# Patient Record
Sex: MALE | Race: WHITE | NOT HISPANIC OR LATINO | Employment: FULL TIME | URBAN - METROPOLITAN AREA
[De-identification: names, ages, dates, MRNs, and addresses within clinical notes are randomized per-mention and may not be internally consistent; named-entity substitution may affect disease eponyms.]

---

## 2017-03-06 ENCOUNTER — ALLSCRIPTS OFFICE VISIT (OUTPATIENT)
Dept: OTHER | Facility: OTHER | Age: 57
End: 2017-03-06

## 2017-03-06 DIAGNOSIS — L40.9 PSORIASIS: ICD-10-CM

## 2017-03-06 DIAGNOSIS — Z13.89 ENCOUNTER FOR SCREENING FOR OTHER DISORDER: ICD-10-CM

## 2017-05-31 ENCOUNTER — ALLSCRIPTS OFFICE VISIT (OUTPATIENT)
Dept: OTHER | Facility: OTHER | Age: 57
End: 2017-05-31

## 2017-08-29 ENCOUNTER — ALLSCRIPTS OFFICE VISIT (OUTPATIENT)
Dept: OTHER | Facility: OTHER | Age: 57
End: 2017-08-29

## 2017-12-04 ENCOUNTER — ALLSCRIPTS OFFICE VISIT (OUTPATIENT)
Dept: OTHER | Facility: OTHER | Age: 57
End: 2017-12-04

## 2017-12-06 NOTE — PROGRESS NOTES
Assessment  1  Psoriasis (696 1) (L40 9)   2  Screening for skin condition (V82 0) (Z13 89)    Plan     · Clobetasol Propionate 0 05 % External Solution; APPLY AND GENTLY MASSAGEINTO AFFECTED AREA(S) TWICE DAILY   · Enbrel SureClick 50 MG/ML Subcutaneous Solution Auto-injector; 3 34YD sc qweek   · Salicylic Acid Powder; 30% sallcylic acid iin the 1 Aquaphor ointment applied topsoriasis plaques daily     · Follow-up visit in 3 months Evaluation and Treatment  Follow-up  Status: Complete Done: 70JBF9988    Discussion/Summary  Discussion Summary:   Psoriasis under good control continue same therapy  Will continue with topical treatment as previous see him back in 3 months will repeat blood work at that time  Nothing else of concern noted  Chief Complaint  Chief Complaint Free Text Note Form: 3 month follow up for psoriasis treatment      History of Present Illness  HPI: 52-year-old male presents for follow-up for psoriasis  Patient doing well better that he was previously noted a was almost clear when he got some sun exposure over the summer  Review of Systems  Complete Male Dermatology [facility] Patient:  Constitutional: Denies constitutional symptoms  Eyes: Denies eye symptoms  ENT:  denies ear symptoms, nasal symptoms, mouth or throat symptoms  Cardiovascular: Denies cardiovascular symptoms  Respiratory: Denies respiratory symptoms  Gastrointestinal: Denies gastrointestinal symptoms  Musculoskeletal: Denies musculoskeletal symptoms  Integumentary: Denies skin, hair and nail symptoms  Neurological: Denies neurologic symptoms  Psychiatric: Denies psychiatric symptoms  Endocrine: Denies endocrine symptoms  Hematologic/Lymphatic: Denies hematologic symptoms  Active Problems  1  Psoriasis (696 1) (L40 9)   2  Screening for skin condition (V82 0) (Z13 89)   3  Well adult on routine health check (V70 0) (Z00 00)    Past Medical History    1  History of Callus (700) (L84)   2   History of Corns (700) (L84)   3  History of psoriasis (V13 3) (Z87 2)   4  History of seborrheic keratosis (V13 3) (Z87 2)   5  History of viral warts (V12 09) (Z86 19)   6  History of viral warts (V12 09) (Z86 19)   7  History of Long term use of drug (V58 69) (Z79 899)   8  History of Long term use of drug (V58 69) (Z79 899)   9  History of Nonspecific reaction to tuberculin skin test without active tuberculosis (795 51) (R76 11)   10  History of Screening examination for pulmonary tuberculosis (V74 1) (Z11 1)   11  History of Screening for skin condition (V82 0) (Z13 89)   12  History of Screening for skin condition (V82 0) (Z13 89)  Past Medical History Reviewed- Derm:   The past medical history was reviewed  Surgical History  1  History of Vaccines Prophylactic Need Against Single Disease (V05 9)  Surgical History Reviewed [facility]- Derm:   Surgical History reviewed      Family History  Mother    1  No pertinent family history  Family History Reviewed- Derm:   Family History was reviewed      Social History     · Former smoker (V15 82) (T19 783)   · [address] (DME)  Social History Reviewed [facility]- Derm: The social history was reviewed      Current Meds   1  AmLODIPine Besylate 10 MG Oral Tablet; Therapy: 13Apr2014 to (Evaluate:13Hmc1513) Recorded   2  Clobetasol Propionate 0 05 % External Solution; APPLY AND GENTLY MASSAGE INTO AFFECTED AREA(S) TWICE DAILY  Requested for: 21Nov2016; Last Rx:21Nov2016 Ordered   3  Clobex Spray 0 05 % External Liquid; Therapy: (Recorded:21Nov2016) to Recorded   4  Dexilant 60 MG Oral Capsule Delayed Release; Therapy: 02VTQ0692 to Recorded   5  Doxepin HCl Powder; Therapy: 77IEW2172 to Recorded   6  Enbrel 50 MG/ML Subcutaneous Solution Prefilled Syringe; INJECT ONE SYRINGE (50 MG) SUBCUTANEOUSLY ONCE A WEEK  REFRIGERATE  DONOT FREEZE; Therapy: 49XCM4602 to (Evaluate:37Knv9903)  Requested for: 83MPO9714; Last Rx:27Nov2017 Ordered   7   Enbrel SureClick 50 MG/ML Subcutaneous Solution Auto-injector; 0 98ml sc q week; Therapy: 01YQS6351 to (Evaluate:78Wgt0908)  Requested for: 32CMU5916; Last Rx:80Ifu0876 Ordered   8  Fish Oil CAPS; Therapy: (Recorded:85Ons2155) to Recorded   9  [place]; Therapy: (Recorded:33Jyc2916) to Recorded   10  Multi-Vitamin TABS; Therapy: (Recorded:45Asa8393) to Recorded   11  Potassimin 75 MG Oral Tablet; Therapy: (Recorded:58Rvs3869) to Recorded   12  Salicylic Acid Powder; 36% sallcylic acid iin the 1 Aquaphor ointment applied to psoriasis  plaques daily; Therapy: 14RGD7424 to (Last Rx:21Nov2016) Ordered   13  Valsartan-Hydrochlorothiazide 320-12 5 MG Oral Tablet; Therapy: 13Apr2014 to (Evaluate:52Kmc0358) Recorded  Medication List Reviewed: The medication list was reviewed and updated today  Allergies    1  No Known Drug Allergies    Physical Exam   Constitutional  General appearance: Appears healthy and well developed  Lymphatic  No visible disturbance  Musculoskeletal  Digits and nails: No clubbing, cyanosis or edema  Cutaneous and nail exam normal    Skin  Scalp skin texture and hair distribution: Normal skin texture on scalp, normal hair distribution  Head: Normal turgor, no rashes, no lesions  Neck: Normal turgor, no rashes, no lesions  Chest: Normal turgor, no rashes, no lesions  Abdomen: Normal turgor, no rashes, no lesions  Right upper extremity: Abnormal    Left upper extremity: Abnormal    Right lower extremity: Abnormal    Neuro/Psych  Alert and oriented x 3  Displays comfort and cooperation during encounterl  Affect is normal    Finding Most of his torso appears clear scaling erythematous well-demarcated patches and plaques noted mostly on the elbows and knees marked improvement from previous still involving close to 5% of body surface area  Future Appointments    Date/Time Provider Specialty Site   03/06/2018 03:15 PM ALVINA Salter   Dermatology [facility] Signatures   Electronically signed by : ALVINA Nails ; Dec  4 2017  5:15PM EST                       (Author)

## 2018-01-12 NOTE — PROGRESS NOTES
Assessment    1  Psoriasis (696 1) (L40 9)   2  Screening for skin condition (V82 0) (Z13 89)    Plan    · Clobetasol Propionate 0 05 % External Solution; APPLY AND GENTLY MASSAGE  INTO AFFECTED AREA(S) TWICE DAILY   · Enbrel SureClick 50 MG/ML Subcutaneous Solution Auto-injector; 0 98ml sc q  week    · Follow-up visit in 3 months Evaluation and Treatment  Follow-up  Status: Complete   Done: 50FKT0733    Discussion/Summary  Discussion Summary:   Psoriasis under good control with Enbrel continue same therapy we'll plan followup in 3 months we also refilled his topical clobetasol solution  Chief Complaint  Chief Complaint Free Text Note Form: 3 MONTH FULL BODY SKIN CANCER EXAM      History of Present Illness  HPI: 80-year-old male presents for followup for psoriasis on Enbrel therapy overall doing well minimal activity still noted but not bothered by it  Recent blood work obtained with negative for TB and routine labs did not show any abnormality      Review of Systems  Complete Male Dermatology [facility] Patient:   Constitutional: Denies constitutional symptoms  Eyes: Denies eye symptoms  ENT:  denies ear symptoms, nasal symptoms, mouth or throat symptoms  Cardiovascular: Denies cardiovascular symptoms  Respiratory: Denies respiratory symptoms  Gastrointestinal: Denies gastrointestinal symptoms  Musculoskeletal: Denies musculoskeletal symptoms  Integumentary: Denies skin, hair and nail symptoms  Neurological: Denies neurologic symptoms  Psychiatric: Denies psychiatric symptoms  Endocrine: Denies endocrine symptoms  Hematologic/Lymphatic: Denies hematologic symptoms  Active Problems    1  Psoriasis (696 1) (L40 9)   2  Screening for skin condition (V82 0) (Z13 89)   3  Well adult on routine health check (V70 0) (Z00 00)    Past Medical History    1  History of Callus (700) (L84)   2  History of Corns (700) (L84)   3  History of psoriasis (V13 3) (Z87 2)   4   History of seborrheic keratosis (V13 3) (Z87 2)   5  History of viral warts (V12 09) (Z86 19)   6  History of viral warts (V12 09) (Z86 19)   7  History of Long term use of drug (V58 69) (Z79 899)   8  History of Long term use of drug (V58 69) (Z79 899)   9  History of Nonspecific reaction to tuberculin skin test without active tuberculosis (795 51)   (R76 11)   10  History of Screening examination for pulmonary tuberculosis (V74 1) (Z11 1)   11  History of Screening for skin condition (V82 0) (Z13 89)   12  History of Screening for skin condition (V82 0) (Z13 89)  Past Medical History Reviewed- Derm:   The past medical history was reviewed  Surgical History    1  History of Vaccines Prophylactic Need Against Single Disease (V05 9)  Surgical History Reviewed [facility]- Derm:   Surgical History reviewed      Family History    1  No pertinent family history  Family History Reviewed- Derm:   Family History was reviewed      Social History    · Former smoker (V15 82) (T00 479)   · [address] (DME)  Social History Reviewed [facility]- Derm: The social history was reviewed      Current Meds   1  AmLODIPine Besylate 10 MG Oral Tablet; Therapy: 13Apr2014 to (Evaluate:99Rmi1974) Recorded   2  Clobetasol Propionate 0 05 % External Solution; APPLY AND GENTLY MASSAGE INTO   AFFECTED AREA(S) TWICE DAILY  Requested for: 40WDV6917; Last Rx:30Jun2015   Ordered   3  Clobex Spray 0 05 % External Liquid; Therapy: (Recorded:18Yim0618) to Recorded   4  Dexilant 60 MG Oral Capsule Delayed Release; Therapy: 27RAB4194 to Recorded   5  Doxepin HCl Powder; Therapy: 63DPF5377 to Recorded   6  Enbrel 50 MG/ML Subcutaneous Solution Prefilled Syringe; INJECT 50MG                 SUBCUTANEOUSLY ONCE A     WEEK; Therapy: 12FZJ8537 to (Evaluate:81Pwd9901)  Requested for: 67Snj1560; Last   Rx:01Oct2015; Status: ACTIVE - Renewal Denied Ordered   7  Enbrel SureClick 50 MG/ML Subcutaneous Solution Auto-injector;    Therapy: 63YNH1936 to Recorded   8  Fish Oil CAPS; Therapy: (Recorded:95Fdm3590) to Recorded   9  Cornel Jus;   Therapy: (Recorded:82Ffp1017) to Recorded   10  Multi-Vitamin TABS; Therapy: (Recorded:91Avx3584) to Recorded   11  Potassimin 75 MG Oral Tablet; Therapy: (Recorded:24Mmi6687) to Recorded   12  Valsartan-Hydrochlorothiazide 320-12 5 MG Oral Tablet; Therapy: 13Apr2014 to (Evaluate:06Xvg5526) Recorded    Allergies    1  No Known Drug Allergies    Physical Exam    Constitutional   General appearance: Appears healthy and well developed  Lymphatic   No visible disturbance  Musculoskeletal   Digits and nails: No clubbing, cyanosis or edema  Cutaneous and nail exam normal     Skin   Scalp skin texture and hair distribution: Abnormal     Head: Normal turgor, no rashes, no lesions  Neck: Normal turgor, no rashes, no lesions  Chest: Normal turgor, no rashes, no lesions  Abdomen: Normal turgor, no rashes, no lesions  Back: Normal turgor, no rashes, no lesions  Right upper extremity: Abnormal     Left upper extremity: Abnormal     Right lower extremity: Abnormal     Left lower extremity: Abnormal     Neuro/Psych   Alert and oriented x 3  Displays comfort and cooperation during encounterl  Affect is normal     Finding Scaling erythematous well-demarcated patches noted on the elbows knees scalp and scattered areas on his back minimal involvement still close to 5% body surface nothing else of concern noted on complete exam       Future Appointments    Date/Time Provider Specialty Site   04/26/2016 03:35 PM ALVINA Hairston   Dermatology [facility] [place]     Signatures   Electronically signed by : ALVINA Garcia ; Jan 25 2016  4:18PM EST                       (Author)

## 2018-04-10 RX ORDER — ETANERCEPT 50 MG/ML
SOLUTION SUBCUTANEOUS
Refills: 2 | OUTPATIENT
Start: 2018-04-10

## 2018-04-16 RX ORDER — ETANERCEPT 50 MG/ML
SOLUTION SUBCUTANEOUS
Refills: 2 | OUTPATIENT
Start: 2018-04-16

## 2018-04-25 ENCOUNTER — OFFICE VISIT (OUTPATIENT)
Dept: DERMATOLOGY | Facility: CLINIC | Age: 58
End: 2018-04-25
Payer: COMMERCIAL

## 2018-04-25 ENCOUNTER — TRANSCRIBE ORDERS (OUTPATIENT)
Dept: LAB | Facility: CLINIC | Age: 58
End: 2018-04-25

## 2018-04-25 DIAGNOSIS — L40.9 PSORIASIS: Primary | ICD-10-CM

## 2018-04-25 PROCEDURE — 99213 OFFICE O/P EST LOW 20 MIN: CPT | Performed by: DERMATOLOGY

## 2018-04-25 RX ORDER — ETANERCEPT 50 MG/ML
SOLUTION SUBCUTANEOUS
Refills: 3 | COMMUNITY
Start: 2018-03-21 | End: 2019-01-22 | Stop reason: ALTCHOICE

## 2018-04-25 RX ORDER — CLOBETASOL PROPIONATE 0.46 MG/ML
SOLUTION TOPICAL 2 TIMES DAILY
COMMUNITY

## 2018-04-25 RX ORDER — TADALAFIL 5 MG/1
5 TABLET ORAL
COMMUNITY
Start: 2017-05-11

## 2018-04-25 RX ORDER — DOXEPIN HYDROCHLORIDE 10 MG/ML
SOLUTION ORAL
COMMUNITY
Start: 2015-02-04

## 2018-04-25 RX ORDER — MULTIVITAMIN
TABLET ORAL
COMMUNITY

## 2018-04-25 RX ORDER — VALSARTAN AND HYDROCHLOROTHIAZIDE 320; 12.5 MG/1; MG/1
TABLET, FILM COATED ORAL
COMMUNITY
Start: 2014-04-13

## 2018-04-25 RX ORDER — AMLODIPINE BESYLATE 10 MG/1
10 TABLET ORAL
COMMUNITY
Start: 2017-04-27

## 2018-04-25 RX ORDER — DEXLANSOPRAZOLE 60 MG/1
CAPSULE, DELAYED RELEASE ORAL
COMMUNITY
Start: 2015-06-08

## 2018-04-25 RX ORDER — CLOBETASOL PROPIONATE 0.05 G/ML
SPRAY TOPICAL
COMMUNITY

## 2018-04-25 NOTE — PATIENT INSTRUCTIONS
psoriasis still flaring despite being on therapy for over 10 years will go ahead and switch to Tremfya to see if we get this under better control    Will repeat blood work at this time

## 2018-04-25 NOTE — PROGRESS NOTES
[address] [facility] [address] [facility] DERMATOLOGY  [address] [address]     MRN: [id number] : 1960  Encounter: [id number]  Patient Information: Rosalea Reveal  Chief complaint: psoriasis follow-up    History of present illness:  60-year-old male presents for follow-up for psoriasis patient has been on Enbrel for almost 10 years overall tolerating it well however still seems to be having more activity especially on his arms and legs  No past medical history on file  No past surgical history on file  Social History   History   Alcohol use Not on file     History   Drug use: Unknown     History   Smoking Status    Former Smoker   Smokeless Tobacco    Never Used     No family history on file  Meds/Allergies   No Known Allergies    Meds:  Prior to Admission medications    Medication Sig Start Date End Date Taking?  Authorizing Provider   amLODIPine (NORVASC) 10 mg tablet Take 10 mg by mouth 17  Yes Historical Provider, MD   clobetasol (TEMOVATE) 0 05 % external solution Apply topically 2 (two) times a day   Yes Historical Provider, MD   clobetasol propionate (CLOBEX SPRAY) 0 05 % external spray Apply topically   Yes Historical Provider, MD   dexlansoprazole (DEXILANT) 60 MG capsule Take by mouth 6/8/15  Yes Historical Provider, MD   doxepin (SINEquan) 10 mg/mL solution by Does not apply route 2/4/15  Yes Historical Provider, MD   ENBREL 50 MG/ML SOSY  3/21/18  Yes Historical Provider, MD   esomeprazole (HM ESOMEPRAZOLE MAGNESIUM DR) 20 mg capsule Take 40 mg by mouth 17  Yes Historical Provider, MD   Multiple Vitamin (MULTI-VITAMIN DAILY) TABS Take by mouth   Yes Historical Provider, MD   tadalafil (CIALIS) 5 MG tablet Take 5 mg by mouth 17  Yes Historical Provider, MD   valsartan-hydrochlorothiazide (DIOVAN-HCT) 320-12 5 MG per tablet Take by mouth 14  Yes Historical Provider, MD       Subjective:     Review of Systems:    General: negative for - chills, fatigue, fever,  weight gain or weight loss  Psychological: negative for - anxiety, behavioral disorder, concentration difficulties, decreased libido, depression, irritability, memory difficulties, mood swings, sleep disturbances or suicidal ideation  ENT: negative for - hearing difficulties , nasal congestion, nasal discharge, oral lesions, sinus pain, sneezing, sore throat  Allergy and Immunology: negative for - hives, insect bite sensitivity,  Hematological and Lymphatic: negative for - bleeding problems, blood clots,bruising, swollen lymph nodes  Endocrine: negative for - hair pattern changes, hot flashes, malaise/lethargy, mood swings, palpitations, polydipsia/polyuria, skin changes, temperature intolerance or unexpected weight change  Respiratory: negative for - cough, hemoptysis, orthopnea, shortness of breath, or wheezing  Cardiovascular: negative for - chest pain, dyspnea on exertion, edema,  Gastrointestinal: negative for - abdominal pain, nausea/vomiting  Genito-Urinary: negative for - dysuria, incontinence, irregular/heavy menses or urinary frequency/urgency  Musculoskeletal: negative for - gait disturbance, joint pain, joint stiffness, joint swelling, muscle pain, muscular weakness  Dermatological:  As in HPI  Neurological: negative for confusion, dizziness, headaches, impaired coordination/balance, memory loss, numbness/tingling, seizures, speech problems, tremors or weakness       Objective: There were no vitals taken for this visit      Physical Exam:    General Appearance:    Alert, cooperative, no distress   Head:    Normocephalic, without obvious abnormality, atraumatic           Skin:   A full skin exam was performed including scalp, head scalp, eyes, ears, nose, lips, neck, chest, axilla, abdomen, back, buttocks, bilateral upper extremities, bilateral lower extremities, hands, feet, fingers, toes, fingernails, and toenails  Erythema scaling well-demarcated patches and plaques noted on widespread areas of his elbows and forearms along with on his legs intergluteal area and scattered on his torso also noted on the scalp     Assessment:     1  Psoriasis           Plan:    psoriasis still flaring despite being on therapy for over 10 years will go ahead and switch to Tremfya to see if we get this under better control  Will repeat blood work at this time     Antione Wilde MD  1/32/0609,2:32 PM    Portions of the record may have been created with voice recognition software   Occasional wrong word or "sound a like" substitutions may have occurred due to the inherent limitations of voice recognition software   Read the chart carefully and recognize, using context, where substitutions have occurred

## 2018-05-04 LAB
ALBUMIN SERPL-MCNC: 4.3 G/DL (ref 3.5–5.5)
ALBUMIN/GLOB SERPL: 1.7 {RATIO} (ref 1.2–2.2)
ALP SERPL-CCNC: 40 IU/L (ref 39–117)
ALT SERPL-CCNC: 39 IU/L (ref 0–44)
ANNOTATION COMMENT IMP: NORMAL
AST SERPL-CCNC: 22 IU/L (ref 0–40)
BASOPHILS # BLD AUTO: 0 X10E3/UL (ref 0–0.2)
BASOPHILS NFR BLD AUTO: 0 %
BILIRUB SERPL-MCNC: 0.4 MG/DL (ref 0–1.2)
BUN SERPL-MCNC: 20 MG/DL (ref 6–24)
BUN/CREAT SERPL: 19 (ref 9–20)
CALCIUM SERPL-MCNC: 9.3 MG/DL (ref 8.7–10.2)
CHLORIDE SERPL-SCNC: 99 MMOL/L (ref 96–106)
CO2 SERPL-SCNC: 23 MMOL/L (ref 18–29)
CREAT SERPL-MCNC: 1.07 MG/DL (ref 0.76–1.27)
EOSINOPHIL # BLD AUTO: 0 X10E3/UL (ref 0–0.4)
EOSINOPHIL NFR BLD AUTO: 1 %
ERYTHROCYTE [DISTWIDTH] IN BLOOD BY AUTOMATED COUNT: 13.7 % (ref 12.3–15.4)
GAMMA INTERFERON BACKGROUND BLD IA-ACNC: 0.04 IU/ML
GLOBULIN SER-MCNC: 2.6 G/DL (ref 1.5–4.5)
GLUCOSE SERPL-MCNC: 92 MG/DL (ref 65–99)
HCT VFR BLD AUTO: 40.2 % (ref 37.5–51)
HGB BLD-MCNC: 14.4 G/DL (ref 13–17.7)
IMM GRANULOCYTES # BLD: 0 X10E3/UL (ref 0–0.1)
IMM GRANULOCYTES NFR BLD: 0 %
LYMPHOCYTES # BLD AUTO: 1.6 X10E3/UL (ref 0.7–3.1)
LYMPHOCYTES NFR BLD AUTO: 34 %
M TB IFN-G BLD-IMP: NEGATIVE
M TB IFN-G CD4+ BCKGRND COR BLD-ACNC: 0 IU/ML
M TB IFN-G CD4+ T-CELLS BLD-ACNC: 0.04 IU/ML
MCH RBC QN AUTO: 32.4 PG (ref 26.6–33)
MCHC RBC AUTO-ENTMCNC: 35.8 G/DL (ref 31.5–35.7)
MCV RBC AUTO: 90 FL (ref 79–97)
MITOGEN IGNF BLD-ACNC: 9.13 IU/ML
MONOCYTES # BLD AUTO: 0.6 X10E3/UL (ref 0.1–0.9)
MONOCYTES NFR BLD AUTO: 14 %
NEUTROPHILS # BLD AUTO: 2.4 X10E3/UL (ref 1.4–7)
NEUTROPHILS NFR BLD AUTO: 51 %
PLATELET # BLD AUTO: 211 X10E3/UL (ref 150–379)
POTASSIUM SERPL-SCNC: 3.9 MMOL/L (ref 3.5–5.2)
PROT SERPL-MCNC: 6.9 G/DL (ref 6–8.5)
QUANTIFERON-TB GOLD IN TUBE: NORMAL
RBC # BLD AUTO: 4.45 X10E6/UL (ref 4.14–5.8)
SERVICE CMNT-IMP: NORMAL
SL AMB EGFR AFRICAN AMERICAN: 89 ML/MIN/1.73
SL AMB EGFR NON AFRICAN AMERICAN: 77 ML/MIN/1.73
SODIUM SERPL-SCNC: 139 MMOL/L (ref 134–144)
WBC # BLD AUTO: 4.7 X10E3/UL (ref 3.4–10.8)

## 2018-05-23 ENCOUNTER — OFFICE VISIT (OUTPATIENT)
Dept: DERMATOLOGY | Facility: CLINIC | Age: 58
End: 2018-05-23
Payer: COMMERCIAL

## 2018-05-23 DIAGNOSIS — L40.9 PSORIASIS: Primary | ICD-10-CM

## 2018-05-23 PROCEDURE — 99212 OFFICE O/P EST SF 10 MIN: CPT | Performed by: DERMATOLOGY

## 2018-05-23 NOTE — PATIENT INSTRUCTIONS
patient to start medication today given injection will go ahead and treat him on 4 weeks and then every 8 weeks after that patient will return follow-up in 4 months

## 2018-05-23 NOTE — PROGRESS NOTES
[address] [facility] [address] [facility] [place] DERMATOLOGY  [address] [address]     MRN: [id number] : 1960  Encounter: [id number]  Patient Information: Arleen Soler    Subjective:     64-year-old male presents for her 1st injection of Tremfya     Objective: There were no vitals taken for this visit  Physical Exam:    General Appearance:    Alert, cooperative, no distress   Skin:    As previously noted involvement of psoriasis with involvement of 40% of body surface area     Assessment:     1  Psoriasis           Plan:    patient to start medication today given injection will go ahead and treat him on 4 weeks and then every 8 weeks after that patient will return follow-up in 4 months      Prior to Admission medications    Medication Sig Start Date End Date Taking?  Authorizing Provider   amLODIPine (NORVASC) 10 mg tablet Take 10 mg by mouth 17  Yes Historical Provider, MD   clobetasol (TEMOVATE) 0 05 % external solution Apply topically 2 (two) times a day   Yes Historical Provider, MD   clobetasol propionate (CLOBEX SPRAY) 0 05 % external spray Apply topically   Yes Historical Provider, MD   esomeprazole (HM ESOMEPRAZOLE MAGNESIUM DR) 20 mg capsule Take 40 mg by mouth 17  Yes Historical Provider, MD   Multiple Vitamin (MULTI-VITAMIN DAILY) TABS Take by mouth   Yes Historical Provider, MD   tadalafil (CIALIS) 5 MG tablet Take 5 mg by mouth 17  Yes Historical Provider, MD   valsartan-hydrochlorothiazide (DIOVAN-HCT) 320-12 5 MG per tablet Take by mouth 14  Yes Historical Provider, MD   dexlansoprazole (DEXILANT) 60 MG capsule Take by mouth 6/8/15   Historical Provider, MD   doxepin (SINEquan) 10 mg/mL solution by Does not apply route 2/4/15   Historical Provider, MD   ENBREL 50 MG/ML SOSY  3/21/18   Historical Provider, MD     No Known Allergies    Antelmo Liriano MD  2018,3:52 PM    Portions of the record may have been created with voice recognition software   Occasional wrong word or "sound a like" substitutions may have occurred due to the inherent limitations of voice recognition software   Read the chart carefully and recognize, using context, where substitutions have occurred

## 2018-08-02 DIAGNOSIS — L40.9 PSORIASIS: Primary | ICD-10-CM

## 2018-08-06 RX ORDER — GUSELKUMAB 100 MG/ML
INJECTION SUBCUTANEOUS
Qty: 1 SYRINGE | Refills: 0 | Status: SHIPPED | OUTPATIENT
Start: 2018-08-06 | End: 2018-09-18 | Stop reason: SDUPTHER

## 2018-09-18 ENCOUNTER — OFFICE VISIT (OUTPATIENT)
Dept: DERMATOLOGY | Facility: CLINIC | Age: 58
End: 2018-09-18
Payer: COMMERCIAL

## 2018-09-18 DIAGNOSIS — Z13.89 SCREENING FOR SKIN CONDITION: ICD-10-CM

## 2018-09-18 DIAGNOSIS — L40.9 PSORIASIS: Primary | ICD-10-CM

## 2018-09-18 PROCEDURE — 99213 OFFICE O/P EST LOW 20 MIN: CPT | Performed by: DERMATOLOGY

## 2018-09-18 NOTE — PROGRESS NOTES
[address]  [address]  [facility] [address]  [phone]  [phone]     MRN: [id number] : 1960  Encounter: [id number]  Patient Information: Davy Bumpers  Chief complaint:3 month checkup    History of present illness:  25-year-old male presents for follow-up for his psoriasis on Tremfya doing well essentially clear  No problems noted  No past medical history on file  No past surgical history on file  Social History   History   Alcohol use Not on file     History   Drug use: Unknown     History   Smoking Status    Former Smoker   Smokeless Tobacco    Never Used     No family history on file  Meds/Allergies   No Known Allergies    Meds:  Prior to Admission medications    Medication Sig Start Date End Date Taking?  Authorizing Provider   amLODIPine (NORVASC) 10 mg tablet Take 10 mg by mouth 17  Yes Historical Provider, MD   clobetasol (TEMOVATE) 0 05 % external solution Apply topically 2 (two) times a day   Yes Historical Provider, MD   clobetasol propionate (CLOBEX SPRAY) 0 05 % external spray Apply topically   Yes Historical Provider, MD   dexlansoprazole (DEXILANT) 60 MG capsule Take by mouth 6/8/15  Yes Historical Provider, MD   doxepin (SINEquan) 10 mg/mL solution by Does not apply route 2/4/15  Yes Historical Provider, MD   esomeprazole (HM ESOMEPRAZOLE MAGNESIUM DR) 20 mg capsule Take 40 mg by mouth 17  Yes Historical Provider, MD   Multiple Vitamin (MULTI-VITAMIN DAILY) TABS Take by mouth   Yes Historical Provider, MD   tadalafil (CIALIS) 5 MG tablet Take 5 mg by mouth 17  Yes Historical Provider, MD   TREMFYA 100 MG/ML SOSY INJECT 100MG UNDER THE SKIN AT WEEK 0, WEEK 4, AND EVERY 8 WEEKS THEREAFTER 18  Yes Fabiana Garcia MD   valsartan-hydrochlorothiazide (DIOVAN-HCT) 320-12 5 MG per tablet Take by mouth 14  Yes Historical Provider, MD   ENBREL 50 MG/ML SOSY  3/21/18   Historical Provider, MD Subjective:     Review of Systems:    General: negative for - chills, fatigue, fever,  weight gain or weight loss  Psychological: negative for - anxiety, behavioral disorder, concentration difficulties, decreased libido, depression, irritability, memory difficulties, mood swings, sleep disturbances or suicidal ideation  ENT: negative for - hearing difficulties , nasal congestion, nasal discharge, oral lesions, sinus pain, sneezing, sore throat  Allergy and Immunology: negative for - hives, insect bite sensitivity,  Hematological and Lymphatic: negative for - bleeding problems, blood clots,bruising, swollen lymph nodes  Endocrine: negative for - hair pattern changes, hot flashes, malaise/lethargy, mood swings, palpitations, polydipsia/polyuria, skin changes, temperature intolerance or unexpected weight change  Respiratory: negative for - cough, hemoptysis, orthopnea, shortness of breath, or wheezing  Cardiovascular: negative for - chest pain, dyspnea on exertion, edema,  Gastrointestinal: negative for - abdominal pain, nausea/vomiting  Genito-Urinary: negative for - dysuria, incontinence, irregular/heavy menses or urinary frequency/urgency  Musculoskeletal: negative for - gait disturbance, joint pain, joint stiffness, joint swelling, muscle pain, muscular weakness  Dermatological:  As in HPI  Neurological: negative for confusion, dizziness, headaches, impaired coordination/balance, memory loss, numbness/tingling, seizures, speech problems, tremors or weakness       Objective: There were no vitals taken for this visit      Physical Exam:    General Appearance:    Alert, cooperative, no distress   Head:    Normocephalic, without obvious abnormality, atraumatic           Skin:   A full skin exam was performed including scalp, head scalp, eyes, ears, nose, lips, neck, chest, axilla, abdomen, back, buttocks, bilateral upper extremities, bilateral lower extremities, hands, feet, fingers, toes, fingernails, and toenails  Psoriasis is essentially clear no problems noted     Assessment:     1  Psoriasis     2  Screening for skin condition           Plan:    psoriasis under great control continue same treatment follow-up in 4 months    Julia Candelaria MD  9/34/0211,1:39 PM    Portions of the record may have been created with voice recognition software   Occasional wrong word or "sound a like" substitutions may have occurred due to the inherent limitations of voice recognition software   Read the chart carefully and recognize, using context, where substitutions have occurred

## 2019-01-22 ENCOUNTER — OFFICE VISIT (OUTPATIENT)
Dept: DERMATOLOGY | Facility: CLINIC | Age: 59
End: 2019-01-22
Payer: COMMERCIAL

## 2019-01-22 DIAGNOSIS — L40.9 PSORIASIS: Primary | ICD-10-CM

## 2019-01-22 PROCEDURE — 99213 OFFICE O/P EST LOW 20 MIN: CPT | Performed by: DERMATOLOGY

## 2019-01-22 NOTE — PROGRESS NOTES
[address]  [address] [facility] [address]  [phone]     MRN: [id number] : 1960  Encounter: [id number]  Patient Information: Aissatou Chowdhury  Chief complaint:  Psoriasis follow-up     History of present illness:  60-year-old male with known history of severe psoriasis on Tremfya  Doing well happy with the way things are going only noted 1 little patch on his left thigh that has subsequently seems to get clear patient is getting injections every 2 months  No past medical history on file  No past surgical history on file  Social History   History   Alcohol use Not on file     History   Drug use: Unknown     History   Smoking Status    Former Smoker   Smokeless Tobacco    Never Used     No family history on file  Meds/Allergies   No Known Allergies    Meds:  Prior to Admission medications    Medication Sig Start Date End Date Taking?  Authorizing Provider   amLODIPine (NORVASC) 10 mg tablet Take 10 mg by mouth 17  Yes Historical Provider, MD   dexlansoprazole (DEXILANT) 60 MG capsule Take by mouth 6/8/15  Yes Historical Provider, MD   doxepin (SINEquan) 10 mg/mL solution by Does not apply route 2/4/15  Yes Historical Provider, MD   Multiple Vitamin (MULTI-VITAMIN DAILY) TABS Take by mouth   Yes Historical Provider, MD   tadalafil (CIALIS) 5 MG tablet Take 5 mg by mouth 17  Yes Historical Provider, MD   valsartan-hydrochlorothiazide (DIOVAN-HCT) 320-12 5 MG per tablet Take by mouth 14  Yes Historical Provider, MD   clobetasol (TEMOVATE) 0 05 % external solution Apply topically 2 (two) times a day    Historical Provider, MD   clobetasol propionate (CLOBEX SPRAY) 0 05 % external spray Apply topically    Historical Provider, MD   ENBREL 50 MG/ML SOSY  3/21/18   Historical Provider, MD   esomeprazole ( ESOMEPRAZOLE MAGNESIUM DR) 20 mg capsule Take 40 mg by mouth 17   Historical Provider, MD       Subjective: Review of Systems:    General: negative for - chills, fatigue, fever,  weight gain or weight loss  Psychological: negative for - anxiety, behavioral disorder, concentration difficulties, decreased libido, depression, irritability, memory difficulties, mood swings, sleep disturbances or suicidal ideation  ENT: negative for - hearing difficulties , nasal congestion, nasal discharge, oral lesions, sinus pain, sneezing, sore throat  Allergy and Immunology: negative for - hives, insect bite sensitivity,  Hematological and Lymphatic: negative for - bleeding problems, blood clots,bruising, swollen lymph nodes  Endocrine: negative for - hair pattern changes, hot flashes, malaise/lethargy, mood swings, palpitations, polydipsia/polyuria, skin changes, temperature intolerance or unexpected weight change  Respiratory: negative for - cough, hemoptysis, orthopnea, shortness of breath, or wheezing  Cardiovascular: negative for - chest pain, dyspnea on exertion, edema,  Gastrointestinal: negative for - abdominal pain, nausea/vomiting  Genito-Urinary: negative for - dysuria, incontinence, irregular/heavy menses or urinary frequency/urgency  Musculoskeletal: negative for - gait disturbance, joint pain, joint stiffness, joint swelling, muscle pain, muscular weakness  Dermatological:  As in HPI  Neurological: negative for confusion, dizziness, headaches, impaired coordination/balance, memory loss, numbness/tingling, seizures, speech problems, tremors or weakness       Objective: There were no vitals taken for this visit      Physical Exam:    General Appearance:    Alert, cooperative, no distress   Head:    Normocephalic, without obvious abnormality, atraumatic           Skin:   A full skin exam was performed including scalp, head scalp, eyes, ears, nose, lips, neck, chest, axilla, abdomen, back, buttocks, bilateral upper extremities, bilateral lower extremities, hands, feet, fingers, toes, fingernails, and toenails psoriasis essentially clear some dry skin noted     Assessment:     1  Psoriasis           Plan:   Patient doing well will continue same treatment and follow-up again in 4 months    Dl Pickett MD  1/22/2019,3:48 PM    Portions of the record may have been created with voice recognition software   Occasional wrong word or "sound a like" substitutions may have occurred due to the inherent limitations of voice recognition software   Read the chart carefully and recognize, using context, where substitutions have occurred

## 2019-02-06 DIAGNOSIS — L40.9 PSORIASIS: ICD-10-CM

## 2019-02-07 RX ORDER — GUSELKUMAB 100 MG/ML
INJECTION SUBCUTANEOUS
Qty: 1 SYRINGE | Refills: 2 | Status: SHIPPED | OUTPATIENT
Start: 2019-02-07 | End: 2019-05-21 | Stop reason: SDUPTHER

## 2019-05-21 ENCOUNTER — OFFICE VISIT (OUTPATIENT)
Dept: DERMATOLOGY | Facility: CLINIC | Age: 59
End: 2019-05-21
Payer: COMMERCIAL

## 2019-05-21 DIAGNOSIS — L40.9 PSORIASIS: Primary | ICD-10-CM

## 2019-05-21 DIAGNOSIS — Z13.89 SCREENING FOR SKIN CONDITION: ICD-10-CM

## 2019-05-21 PROCEDURE — 99213 OFFICE O/P EST LOW 20 MIN: CPT | Performed by: DERMATOLOGY

## 2019-06-17 ENCOUNTER — TELEPHONE (OUTPATIENT)
Dept: DERMATOLOGY | Facility: CLINIC | Age: 59
End: 2019-06-17

## 2019-08-19 ENCOUNTER — OFFICE VISIT (OUTPATIENT)
Dept: DERMATOLOGY | Facility: CLINIC | Age: 59
End: 2019-08-19
Payer: COMMERCIAL

## 2019-08-19 DIAGNOSIS — L82.1 VERRUCOUS KERATOSIS: ICD-10-CM

## 2019-08-19 DIAGNOSIS — Z13.89 SCREENING FOR SKIN CONDITION: ICD-10-CM

## 2019-08-19 DIAGNOSIS — L40.9 PSORIASIS: Primary | ICD-10-CM

## 2019-08-19 PROCEDURE — 99213 OFFICE O/P EST LOW 20 MIN: CPT | Performed by: DERMATOLOGY

## 2019-08-19 NOTE — PROGRESS NOTES
[address]  [address] [facility] [address]  [phone]  [phone]     MRN: [id number] : 1960  Encounter: [id number]  Patient Information: Davy Bumpers  Chief complaint:  Psoriasis follow-up and growth on right cheek    History of present illness:  27-year-old male on Tremfya for follow-up for psoriasis  Patient has been on medication for 15 months and has essentially cleared  No active psoriasis noted in addition has a growth on his right cheek that is bothersome to him  History reviewed  No pertinent past medical history  History reviewed  No pertinent surgical history  Social History   Social History     Substance and Sexual Activity   Alcohol Use Not on file     Social History     Substance and Sexual Activity   Drug Use Not on file     Social History     Tobacco Use   Smoking Status Former Smoker   Smokeless Tobacco Never Used     History reviewed  No pertinent family history  Meds/Allergies   No Known Allergies    Meds:  Prior to Admission medications    Medication Sig Start Date End Date Taking?  Authorizing Provider   amLODIPine (NORVASC) 10 mg tablet Take 10 mg by mouth 17  Yes Historical Provider, MD   clobetasol (TEMOVATE) 0 05 % external solution Apply topically 2 (two) times a day   Yes Historical Provider, MD   clobetasol propionate (CLOBEX SPRAY) 0 05 % external spray Apply topically   Yes Historical Provider, MD   dexlansoprazole (DEXILANT) 60 MG capsule Take by mouth 6/8/15  Yes Historical Provider, MD   doxepin (SINEquan) 10 mg/mL solution by Does not apply route 2/4/15  Yes Historical Provider, MD   Guselkumab (TREMFYA) 100 MG/ML SOSY Injected 100 mg under the skin once every 8 weeks 19  Yes Fabiana Garcia MD   Multiple Vitamin (MULTI-VITAMIN DAILY) TABS Take by mouth   Yes Historical Provider, MD   tadalafil (CIALIS) 5 MG tablet Take 5 mg by mouth 17  Yes Historical Provider, MD valsartan-hydrochlorothiazide (DIOVAN-HCT) 320-12 5 MG per tablet Take by mouth 4/13/14  Yes Historical Provider, MD   esomeprazole ( ESOMEPRAZOLE MAGNESIUM DR) 20 mg capsule Take 40 mg by mouth 5/11/17   Historical Provider, MD       Subjective:     Review of Systems:    General: negative for - chills, fatigue, fever,  weight gain or weight loss  Psychological: negative for - anxiety, behavioral disorder, concentration difficulties, decreased libido, depression, irritability, memory difficulties, mood swings, sleep disturbances or suicidal ideation  ENT: negative for - hearing difficulties , nasal congestion, nasal discharge, oral lesions, sinus pain, sneezing, sore throat  Allergy and Immunology: negative for - hives, insect bite sensitivity,  Hematological and Lymphatic: negative for - bleeding problems, blood clots,bruising, swollen lymph nodes  Endocrine: negative for - hair pattern changes, hot flashes, malaise/lethargy, mood swings, palpitations, polydipsia/polyuria, skin changes, temperature intolerance or unexpected weight change  Respiratory: negative for - cough, hemoptysis, orthopnea, shortness of breath, or wheezing  Cardiovascular: negative for - chest pain, dyspnea on exertion, edema,  Gastrointestinal: negative for - abdominal pain, nausea/vomiting  Genito-Urinary: negative for - dysuria, incontinence, irregular/heavy menses or urinary frequency/urgency  Musculoskeletal: negative for - gait disturbance, joint pain, joint stiffness, joint swelling, muscle pain, muscular weakness  Dermatological:  As in HPI  Neurological: negative for confusion, dizziness, headaches, impaired coordination/balance, memory loss, numbness/tingling, seizures, speech problems, tremors or weakness       Objective: There were no vitals taken for this visit      Physical Exam:    General Appearance:    Alert, cooperative, no distress   Head:    Normocephalic, without obvious abnormality, atraumatic           Skin:   A full skin exam was performed including scalp, head scalp, eyes, ears, nose, lips, neck, chest, axilla, abdomen, back, buttocks, bilateral upper extremities, bilateral lower extremities, hands, feet, fingers, toes, fingernails, and toenails 2 mm verrucous keratotic papule noted on the right cheek no evidence of psoriasis noted on complete cutaneous exam  Cryotherapy Procedure Note    Pre-operative Diagnosis:  Verrucous keratosis    Plan:  1  Instructed to keep the area dry and clean thereafter  Apply petrolatum if area gets crusty  2  Recommended that the patient use acetaminophen  as needed for pain  Locations:  Right cheek     Indications: Destruction of lesion x1    Patient informed of risks (permanent scarring, infection, light or dark discoloration, bleeding, infection, weakness, numbness and recurrence of the lesion) and benefits of the procedure and verbal informed consent obtained  The areas are treated with liquid nitrogen therapy, frozen until ice ball extended 2 mm beyond lesion, allowed to thaw, and treated again  The patient tolerated procedure well  The patient was instructed on post-op care, warned that there may be blister formation, redness and pain  Recommend OTC analgesia as needed for pain  Condition:  Stable    Complications:  none  Assessment:     1  Psoriasis     2  Screening for skin condition     3  Verrucous keratosis           Plan:   Psoriasis in remission continue same treatment  His insurance denied the use of this medication despite him being completely clear    Will see if we can get covered through the pharmaceutical company   Verrucous keratosis lesion treated because the patient concern irritation  Screening for Dermatologic Disorders: Nothing else of concern noted on complete exam follow up in 1 year       Jillene Siemens, MD  8/19/2019,3:18 PM    Portions of the record may have been created with voice recognition software   Occasional wrong word or "sound a like" substitutions may have occurred due to the inherent limitations of voice recognition software   Read the chart carefully and recognize, using context, where substitutions have occurred

## 2019-08-19 NOTE — PATIENT INSTRUCTIONS
Psoriasis in remission continue same treatment  His insurance denied the use of this medication despite him being completely clear    Will see if we can get covered through the pharmaceutical company   Verrucous keratosis lesion treated because the patient concern irritation  Screening for Dermatologic Disorders: Nothing else of concern noted on complete exam follow up in 1 year

## 2019-08-28 ENCOUNTER — TELEPHONE (OUTPATIENT)
Dept: DERMATOLOGY | Facility: CLINIC | Age: 59
End: 2019-08-28

## 2019-09-11 ENCOUNTER — TELEPHONE (OUTPATIENT)
Dept: DERMATOLOGY | Facility: CLINIC | Age: 59
End: 2019-09-11

## 2019-09-16 ENCOUNTER — TELEPHONE (OUTPATIENT)
Dept: DERMATOLOGY | Facility: CLINIC | Age: 59
End: 2019-09-16

## 2019-09-17 ENCOUNTER — TELEPHONE (OUTPATIENT)
Dept: DERMATOLOGY | Facility: CLINIC | Age: 59
End: 2019-09-17

## 2019-09-20 ENCOUNTER — TELEPHONE (OUTPATIENT)
Dept: DERMATOLOGY | Facility: CLINIC | Age: 59
End: 2019-09-20

## 2019-12-19 ENCOUNTER — OFFICE VISIT (OUTPATIENT)
Dept: DERMATOLOGY | Facility: CLINIC | Age: 59
End: 2019-12-19
Payer: COMMERCIAL

## 2019-12-19 DIAGNOSIS — Z13.89 SCREENING FOR SKIN CONDITION: ICD-10-CM

## 2019-12-19 DIAGNOSIS — L40.9 PSORIASIS: Primary | ICD-10-CM

## 2019-12-19 PROCEDURE — 99213 OFFICE O/P EST LOW 20 MIN: CPT | Performed by: DERMATOLOGY

## 2019-12-19 NOTE — PROGRESS NOTES
[address]  [address] [facility]  [place] [address]  [phone]  [phone]     MRN: [id number] : 1960  Encounter: [id number]  Patient Information: Joann Regalado  Chief complaint:  Psoriasis follow-up    History of present illness:  43-year-old male with history of psoriasis on Tremfya doing well no problems noted no active lesions  No problems with the medication  No past medical history on file  No past surgical history on file  Social History   Social History     Substance and Sexual Activity   Alcohol Use Not on file     Social History     Substance and Sexual Activity   Drug Use Not on file     Social History     Tobacco Use   Smoking Status Former Smoker   Smokeless Tobacco Never Used     No family history on file  Meds/Allergies   No Known Allergies    Meds:  Prior to Admission medications    Medication Sig Start Date End Date Taking?  Authorizing Provider   amLODIPine (NORVASC) 10 mg tablet Take 10 mg by mouth 17  Yes Historical Provider, MD   clobetasol (TEMOVATE) 0 05 % external solution Apply topically 2 (two) times a day   Yes Historical Provider, MD   clobetasol propionate (CLOBEX SPRAY) 0 05 % external spray Apply topically   Yes Historical Provider, MD   dexlansoprazole (DEXILANT) 60 MG capsule Take by mouth 6/8/15  Yes Historical Provider, MD   doxepin (SINEquan) 10 mg/mL solution by Does not apply route 2/4/15  Yes Historical Provider, MD   esomeprazole (HM ESOMEPRAZOLE MAGNESIUM DR) 20 mg capsule Take 40 mg by mouth 17  Yes Historical Provider, MD   Guselkumab (TREMFYA) 100 MG/ML SOSY Injected 100 mg under the skin once every 8 weeks 19  Yes Aisha Davey MD   Multiple Vitamin (MULTI-VITAMIN DAILY) TABS Take by mouth   Yes Historical Provider, MD   tadalafil (CIALIS) 5 MG tablet Take 5 mg by mouth 17  Yes Historical Provider, MD   valsartan-hydrochlorothiazide (DIOVAN-HCT) 320-12 5 MG per tablet Take by mouth 4/13/14  Yes Historical Provider, MD       Subjective:     Review of Systems:    General: negative for - chills, fatigue, fever,  weight gain or weight loss  Psychological: negative for - anxiety, behavioral disorder, concentration difficulties, decreased libido, depression, irritability, memory difficulties, mood swings, sleep disturbances or suicidal ideation  ENT: negative for - hearing difficulties , nasal congestion, nasal discharge, oral lesions, sinus pain, sneezing, sore throat  Allergy and Immunology: negative for - hives, insect bite sensitivity,  Hematological and Lymphatic: negative for - bleeding problems, blood clots,bruising, swollen lymph nodes  Endocrine: negative for - hair pattern changes, hot flashes, malaise/lethargy, mood swings, palpitations, polydipsia/polyuria, skin changes, temperature intolerance or unexpected weight change  Respiratory: negative for - cough, hemoptysis, orthopnea, shortness of breath, or wheezing  Cardiovascular: negative for - chest pain, dyspnea on exertion, edema,  Gastrointestinal: negative for - abdominal pain, nausea/vomiting  Genito-Urinary: negative for - dysuria, incontinence, irregular/heavy menses or urinary frequency/urgency  Musculoskeletal: negative for - gait disturbance, joint pain, joint stiffness, joint swelling, muscle pain, muscular weakness  Dermatological:  As in HPI  Neurological: negative for confusion, dizziness, headaches, impaired coordination/balance, memory loss, numbness/tingling, seizures, speech problems, tremors or weakness       Objective: There were no vitals taken for this visit      Physical Exam:    General Appearance:    Alert, cooperative, no distress   Head:    Normocephalic, without obvious abnormality, atraumatic           Skin:   A full skin exam was performed including scalp, head scalp, eyes, ears, nose, lips, neck, chest, axilla, abdomen, back, buttocks, bilateral upper extremities, bilateral lower extremities, hands, feet, fingers, toes, fingernails, and toenails no active psoriasis noted     Assessment:     1  Psoriasis           Plan:   Psoriasis under excellent control continue same therapy will plan follow-up again in 4 months  Screening for dermatologic disorders nothing else of concern noted on complete exam follow-up in 4 months    Lexus España MD  12/19/2019,4:10 PM    Portions of the record may have been created with voice recognition software   Occasional wrong word or "sound a like" substitutions may have occurred due to the inherent limitations of voice recognition software   Read the chart carefully and recognize, using context, where substitutions have occurred

## 2019-12-19 NOTE — PATIENT INSTRUCTIONS
Psoriasis under excellent control continue same therapy will plan follow-up again in 4 months  Screening for dermatologic disorders nothing else of concern noted on complete exam follow-up in 4 months

## 2019-12-27 DIAGNOSIS — L40.9 PSORIASIS: ICD-10-CM

## 2019-12-28 RX ORDER — GUSELKUMAB 100 MG/ML
INJECTION SUBCUTANEOUS
Qty: 1 ML | Refills: 1 | Status: SHIPPED | OUTPATIENT
Start: 2019-12-28 | End: 2020-03-23 | Stop reason: SDUPTHER

## 2020-01-22 ENCOUNTER — TELEPHONE (OUTPATIENT)
Dept: DERMATOLOGY | Facility: CLINIC | Age: 60
End: 2020-01-22

## 2020-03-23 ENCOUNTER — TELEPHONE (OUTPATIENT)
Dept: DERMATOLOGY | Facility: CLINIC | Age: 60
End: 2020-03-23

## 2020-03-23 DIAGNOSIS — L40.9 PSORIASIS: ICD-10-CM

## 2020-03-30 ENCOUNTER — TELEPHONE (OUTPATIENT)
Dept: DERMATOLOGY | Facility: CLINIC | Age: 60
End: 2020-03-30

## 2020-04-07 ENCOUNTER — TELEPHONE (OUTPATIENT)
Dept: DERMATOLOGY | Facility: CLINIC | Age: 60
End: 2020-04-07

## 2020-04-21 ENCOUNTER — TELEMEDICINE (OUTPATIENT)
Dept: DERMATOLOGY | Facility: CLINIC | Age: 60
End: 2020-04-21
Payer: COMMERCIAL

## 2020-04-21 DIAGNOSIS — L40.9 PSORIASIS: Primary | ICD-10-CM

## 2020-04-21 PROCEDURE — 99213 OFFICE O/P EST LOW 20 MIN: CPT | Performed by: DERMATOLOGY

## 2020-04-21 RX ORDER — ATORVASTATIN CALCIUM 20 MG/1
TABLET, FILM COATED ORAL
COMMUNITY
Start: 2020-01-28

## 2020-07-20 DIAGNOSIS — L40.9 PSORIASIS: ICD-10-CM

## 2020-07-29 ENCOUNTER — TELEPHONE (OUTPATIENT)
Dept: DERMATOLOGY | Facility: CLINIC | Age: 60
End: 2020-07-29

## 2020-08-25 ENCOUNTER — TELEMEDICINE (OUTPATIENT)
Dept: DERMATOLOGY | Facility: CLINIC | Age: 60
End: 2020-08-25
Payer: COMMERCIAL

## 2020-08-25 DIAGNOSIS — L40.9 PSORIASIS: Primary | ICD-10-CM

## 2020-08-25 DIAGNOSIS — Z13.89 SCREENING FOR SKIN CONDITION: ICD-10-CM

## 2020-08-25 PROCEDURE — 99212 OFFICE O/P EST SF 10 MIN: CPT | Performed by: DERMATOLOGY

## 2020-08-25 RX ORDER — GUSELKUMAB 100 MG/ML
INJECTION SUBCUTANEOUS
Qty: 1 ML | Refills: 1 | Status: SHIPPED | OUTPATIENT
Start: 2020-08-25 | End: 2021-02-02

## 2020-08-25 NOTE — PROGRESS NOTES
Virtual Regular Visit  Assessment/Plan:   1  Psoriasis  CBC and differential    Comprehensive metabolic panel    Quantiferon TB Gold Plus    CBC and differential    Comprehensive metabolic panel    Guselkumab (Tremfya) 100 MG/ML SOSY   2  Screening for skin condition      continue Triemfya will repeat blood work  And patient needs to be seen in 4 months at the office for complete checkup      Problem List Items Addressed This Visit        Musculoskeletal and Integument    Psoriasis - Primary    Relevant Medications    Guselkumab (Tremfya) 100 MG/ML SOSY    Other Relevant Orders    CBC and differential    Comprehensive metabolic panel    Quantiferon TB Gold Plus      Other Visit Diagnoses     Screening for skin condition                   Reason for visit is   For psoriasis follow-up  Chief Complaint   Patient presents with    Virtual Regular Visit    Virtual Regular Visit        Encounter provider Shady Mata MD    Provider located at [address] [place] [address]      Recent Visits  No visits were found meeting these conditions  Showing recent visits within past 7 days and meeting all other requirements     Today's Visits  Date Type Provider Dept   08/25/20 Telemedicine Shady Mata MD [address] [facility] Dermatology   Showing today's visits and meeting all other requirements     Future Appointments  No visits were found meeting these conditions  Showing future appointments within next 150 days and meeting all other requirements        The patient was identified by name and date of birth  Lorenza Miki was informed that this is a telemedicine visit and that the visit is being conducted through [employer] and patient was informed that this is not a secure, HIPAA-complaint platform  He agrees to proceed     My office door was closed  No one else was in the room    He acknowledged consent and understanding of privacy and security of the video platform  The patient has agreed to participate and understands they can discontinue the visit at any time  Patient is aware this is a billable service  Subjective  Nathan Long is a 63 y o  male  With history of psoriasis on Tremfya  Doing well no activity of his psoriasis is noted patient still has joint issues but does not seem to be any different no other concerns at this time      HPI     No past medical history on file  No past surgical history on file  Current Outpatient Medications   Medication Sig Dispense Refill    amLODIPine (NORVASC) 10 mg tablet Take 10 mg by mouth      atorvastatin (LIPITOR) 20 mg tablet       clobetasol (TEMOVATE) 0 05 % external solution Apply topically 2 (two) times a day      clobetasol propionate (CLOBEX SPRAY) 0 05 % external spray Apply topically      dexlansoprazole (DEXILANT) 60 MG capsule Take by mouth      doxepin (SINEquan) 10 mg/mL solution by Does not apply route      esomeprazole (HM ESOMEPRAZOLE MAGNESIUM DR) 20 mg capsule Take 40 mg by mouth      Guselkumab (Tremfya) 100 MG/ML SOSY INJECT 100 MG UNDER THE SKIN EVERY 8 WEEKS 1 mL 1    Multiple Vitamin (MULTI-VITAMIN DAILY) TABS Take by mouth      tadalafil (CIALIS) 5 MG tablet Take 5 mg by mouth      valsartan-hydrochlorothiazide (DIOVAN-HCT) 320-12 5 MG per tablet Take by mouth       No current facility-administered medications for this visit  No Known Allergies    Review of Systems    Video Exam    There were no vitals filed for this visit  Physical Exam  Essentially clear no evidence of psoriasis    I spent 15 minutes directly with the patient during this visit      VIRTUAL VISIT [address] acknowledges that he has consented to an online visit or consultation   He understands that the online visit is based solely on information provided by him, and that, in the absence of a face-to-face physical evaluation by the physician, the diagnosis he receives is both limited and provisional in terms of accuracy and completeness  This is not intended to replace a full medical face-to-face evaluation by the physician  Benito Ma understands and accepts these terms

## 2020-12-15 ENCOUNTER — TELEMEDICINE (OUTPATIENT)
Dept: DERMATOLOGY | Facility: CLINIC | Age: 60
End: 2020-12-15
Payer: COMMERCIAL

## 2020-12-15 DIAGNOSIS — Z13.89 SCREENING FOR SKIN CONDITION: ICD-10-CM

## 2020-12-15 DIAGNOSIS — L40.9 PSORIASIS: Primary | ICD-10-CM

## 2020-12-15 PROCEDURE — 99212 OFFICE O/P EST SF 10 MIN: CPT | Performed by: DERMATOLOGY

## 2020-12-15 RX ORDER — LOSARTAN POTASSIUM AND HYDROCHLOROTHIAZIDE 25; 100 MG/1; MG/1
TABLET ORAL
COMMUNITY
Start: 2020-12-02

## 2021-01-07 ENCOUNTER — OFFICE VISIT (OUTPATIENT)
Dept: DERMATOLOGY | Facility: CLINIC | Age: 61
End: 2021-01-07
Payer: COMMERCIAL

## 2021-01-07 ENCOUNTER — TELEPHONE (OUTPATIENT)
Dept: DERMATOLOGY | Facility: CLINIC | Age: 61
End: 2021-01-07

## 2021-01-07 VITALS — TEMPERATURE: 98 F

## 2021-01-07 DIAGNOSIS — L23.9 ALLERGIC CONTACT DERMATITIS, UNSPECIFIED TRIGGER: Primary | ICD-10-CM

## 2021-01-07 PROCEDURE — 99213 OFFICE O/P EST LOW 20 MIN: CPT | Performed by: DERMATOLOGY

## 2021-01-07 RX ORDER — FLUTICASONE PROPIONATE 0.05 %
CREAM (GRAM) TOPICAL 2 TIMES DAILY
Qty: 30 G | Refills: 1 | Status: SHIPPED | OUTPATIENT
Start: 2021-01-07

## 2021-01-07 RX ORDER — FLUTICASONE PROPIONATE 0.05 %
CREAM (GRAM) TOPICAL 2 TIMES DAILY
Qty: 30 G | Refills: 1 | Status: SHIPPED | OUTPATIENT
Start: 2021-01-07 | End: 2021-01-07 | Stop reason: SDUPTHER

## 2021-01-07 NOTE — PROGRESS NOTES
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  Authorizing Provider   amLODIPine (NORVASC) 10 mg tablet Take 10 mg by mouth 17  Yes Historical Provider, MD   atorvastatin (LIPITOR) 20 mg tablet  20  Yes Historical Provider, MD   clobetasol (TEMOVATE) 0 05 % external solution Apply topically 2 (two) times a day   Yes Historical Provider, MD   clobetasol propionate (CLOBEX SPRAY) 0 05 % external spray Apply topically   Yes Historical Provider, MD   dexlansoprazole (DEXILANT) 60 MG capsule Take by mouth 6/8/15  Yes Historical Provider, MD   doxepin (SINEquan) 10 mg/mL solution by Does not apply route 2/4/15  Yes Historical Provider, MD   esomeprazole (HM ESOMEPRAZOLE MAGNESIUM DR) 20 mg capsule Take 40 mg by mouth 17  Yes Historical Provider, MD   Guselkumab (Tremfya) 100 MG/ML SOSY INJECT 100 MG UNDER THE SKIN EVERY 8 WEEKS 20  Yes Claudette Shade, MD losartan-hydrochlorothiazide (HYZAAR) 100-25 MG per tablet TK 1 T PO QD 12/2/20  Yes Historical Provider, MD   Multiple Vitamin (MULTI-VITAMIN DAILY) TABS Take by mouth   Yes Historical Provider, MD   tadalafil (CIALIS) 5 MG tablet Take 5 mg by mouth 5/11/17  Yes Historical Provider, MD   valsartan-hydrochlorothiazide (DIOVAN-HCT) 320-12 5 MG per tablet Take by mouth 4/13/14  Yes Historical Provider, MD       Subjective:     Review of Systems:    General: negative for - chills, fatigue, fever,  weight gain or weight loss  Psychological: negative for - anxiety, behavioral disorder, concentration difficulties, decreased libido, depression, irritability, memory difficulties, mood swings, sleep disturbances or suicidal ideation  ENT: negative for - hearing difficulties , nasal congestion, nasal discharge, oral lesions, sinus pain, sneezing, sore throat  Allergy and Immunology: negative for - hives, insect bite sensitivity,  Hematological and Lymphatic: negative for - bleeding problems, blood clots,bruising, swollen lymph nodes  Endocrine: negative for - hair pattern changes, hot flashes, malaise/lethargy, mood swings, palpitations, polydipsia/polyuria, skin changes, temperature intolerance or unexpected weight change  Respiratory: negative for - cough, hemoptysis, orthopnea, shortness of breath, or wheezing  Cardiovascular: negative for - chest pain, dyspnea on exertion, edema,  Gastrointestinal: negative for - abdominal pain, nausea/vomiting  Genito-Urinary: negative for - dysuria, incontinence, irregular/heavy menses or urinary frequency/urgency  Musculoskeletal: negative for - gait disturbance, joint pain, joint stiffness, joint swelling, muscle pain, muscular weakness  Dermatological:  As in HPI  Neurological: negative for confusion, dizziness, headaches, impaired coordination/balance, memory loss, numbness/tingling, seizures, speech problems, tremors or weakness       Objective:   Temp 98 °F (36 7 °C) Physical Exam:    General Appearance:    Alert, cooperative, no distress   Head:    Normocephalic, without obvious abnormality, atraumatic           Skin:   A full skin exam was performed including scalp, head scalp, eyes, ears, nose, lips, neck, chest, axilla, abdomen, back, buttocks, bilateral upper extremities, bilateral lower extremities, hands, feet, fingers, toes, fingernails, and toenails scaling erythematous well-demarcated patches noted on his face eyelids and hands  Assessment:     1  Allergic contact dermatitis, unspecified trigger           Plan: Will go ahead treat with a topical steroid if no improvement patient to call otherwise will plan on patch testing in the future    Edgar Valencia MD  1/7/2021,10:13 AM    Portions of the record may have been created with voice recognition software   Occasional wrong word or "sound a like" substitutions may have occurred due to the inherent limitations of voice recognition software   Read the chart carefully and recognize, using context, where substitutions have occurred

## 2021-01-07 NOTE — PATIENT INSTRUCTIONS
Will go ahead treat with a topical steroid if no improvement patient to call otherwise will plan on patch testing in the future

## 2021-02-02 DIAGNOSIS — L40.9 PSORIASIS: ICD-10-CM

## 2021-02-02 RX ORDER — GUSELKUMAB 100 MG/ML
INJECTION SUBCUTANEOUS
Qty: 1 SYRINGE | Refills: 0 | Status: SHIPPED | OUTPATIENT
Start: 2021-02-02 | End: 2021-07-15

## 2021-02-15 ENCOUNTER — OFFICE VISIT (OUTPATIENT)
Dept: DERMATOLOGY | Facility: CLINIC | Age: 61
End: 2021-02-15
Payer: COMMERCIAL

## 2021-02-15 VITALS — TEMPERATURE: 96.6 F

## 2021-02-15 DIAGNOSIS — L23.9 ALLERGIC CONTACT DERMATITIS, UNSPECIFIED TRIGGER: Primary | ICD-10-CM

## 2021-02-15 PROCEDURE — 95044 PATCH/APPLICATION TESTS: CPT | Performed by: DERMATOLOGY

## 2021-02-15 NOTE — PROGRESS NOTES
[address]  [address] [facility] [address]  [phone]  [phone]     MRN: [id number] : 1960  Encounter: [id number]  Patient Information: Carl Reyna    Subjective:     80-year-old male presents for follow-up for planned patch testing for recent bout of allergic contact dermatitis     Objective:   Temp (!) 96 6 °F (35 9 °C)     Physical Exam:    General Appearance:    Alert, cooperative, no distress   Skin:   Dermatitis has improved with the topical therapy  Patch test times 36 were applied to the back via true test     Assessment:     1  Allergic contact dermatitis, unspecified trigger           Plan:   Instructions given the patient will plan follow-up again on Wednesday and Thursday      Prior to Admission medications    Medication Sig Start Date End Date Taking?  Authorizing Provider   amLODIPine (NORVASC) 10 mg tablet Take 10 mg by mouth 17   Historical Provider, MD   atorvastatin (LIPITOR) 20 mg tablet  20   Historical Provider, MD   clobetasol (TEMOVATE) 0 05 % external solution Apply topically 2 (two) times a day    Historical Provider, MD   clobetasol propionate (CLOBEX SPRAY) 0 05 % external spray Apply topically    Historical Provider, MD   dexlansoprazole (DEXILANT) 60 MG capsule Take by mouth 6/8/15   Historical Provider, MD   doxepin (SINEquan) 10 mg/mL solution by Does not apply route 2/4/15   Historical Provider, MD   esomeprazole (HM ESOMEPRAZOLE MAGNESIUM DR) 20 mg capsule Take 40 mg by mouth 17   Historical Provider, MD   fluticasone (CUTIVATE) 0 05 % cream Apply topically 2 (two) times a day To rash till resolved 21   Claudette Shade, MD   Guselkumab (Tremfya) 100 MG/ML SOSY INJECT 1 SYRINGE UNDER THE SKIN EVERY 8 WEEKS  21   Claudette Shade, MD   losartan-hydrochlorothiazide (HYZAAR) 100-25 MG per tablet TK 1 T PO QD 20   Historical Provider, MD   Multiple Vitamin (MULTI-VITAMIN DAILY) TABS Take by mouth    Historical Provider, MD   tadalafil (CIALIS) 5 MG tablet Take 5 mg by mouth 5/11/17   Historical Provider, MD   valsartan-hydrochlorothiazide (DIOVAN-HCT) 320-12 5 MG per tablet Take by mouth 4/13/14   Historical Provider, MD     No Known Allergies    Nataliia Ashraf MD  5/63/7750,2:19 PM    Portions of the record may have been created with voice recognition software   Occasional wrong word or "sound a like" substitutions may have occurred due to the inherent limitations of voice recognition software   Read the chart carefully and recognize, using context, where substitutions have occurred

## 2021-02-17 ENCOUNTER — OFFICE VISIT (OUTPATIENT)
Dept: DERMATOLOGY | Facility: CLINIC | Age: 61
End: 2021-02-17
Payer: COMMERCIAL

## 2021-02-17 VITALS — TEMPERATURE: 97.7 F

## 2021-02-17 DIAGNOSIS — L23.9 ALLERGIC CONTACT DERMATITIS, UNSPECIFIED TRIGGER: Primary | ICD-10-CM

## 2021-02-17 PROCEDURE — 99212 OFFICE O/P EST SF 10 MIN: CPT | Performed by: DERMATOLOGY

## 2021-02-17 NOTE — PROGRESS NOTES
[address]  [facility] [address]  [place] [address]  [phone]     MRN: [id number] : 1960  Encounter: [id number]  Patient Information: Sonia Wilkes    Subjective:     52-year-old male presents for follow-up for 48 hour reading of patch test     Objective:   Temp 97 7 °F (36 5 °C)     Physical Exam:    General Appearance:    Alert, cooperative, no distress   Skin:   Previous site of patch tests show positive reaction to plan to 3+ for carba mix and thiuram mix     Assessment:     1  Allergic contact dermatitis, unspecified trigger           Plan:   Patient with 2+ reactions to both Carba mix and thiuram patient patient advise that this is components of rubber gloves that he is probably using at work advised him that this may improve and go away if he stops wearing rubber gloves he can use vinyl or cloth gloves for protection with planned      Prior to Admission medications    Medication Sig Start Date End Date Taking?  Authorizing Provider   amLODIPine (NORVASC) 10 mg tablet Take 10 mg by mouth 17   Historical Provider, MD   atorvastatin (LIPITOR) 20 mg tablet  20   Historical Provider, MD   clobetasol (TEMOVATE) 0 05 % external solution Apply topically 2 (two) times a day    Historical Provider, MD   clobetasol propionate (CLOBEX SPRAY) 0 05 % external spray Apply topically    Historical Provider, MD   dexlansoprazole (DEXILANT) 60 MG capsule Take by mouth 6/8/15   Historical Provider, MD   doxepin (SINEquan) 10 mg/mL solution by Does not apply route 2/4/15   Historical Provider, MD   esomeprazole (HM ESOMEPRAZOLE MAGNESIUM DR) 20 mg capsule Take 40 mg by mouth 17   Historical Provider, MD   fluticasone (CUTIVATE) 0 05 % cream Apply topically 2 (two) times a day To rash till resolved 21   Keaton Maldonado MD   Guselkumab (Tremfya) 100 MG/ML SOSY INJECT [address] SKIN EVERY 8 WEEKS  21   Keaton Maldonado MD losartan-hydrochlorothiazide (HYZAAR) 100-25 MG per tablet TK 1 T PO QD 12/2/20   Historical Provider, MD   Multiple Vitamin (MULTI-VITAMIN DAILY) TABS Take by mouth    Historical Provider, MD   tadalafil (CIALIS) 5 MG tablet Take 5 mg by mouth 5/11/17   Historical Provider, MD   valsartan-hydrochlorothiazide (DIOVAN-HCT) 320-12 5 MG per tablet Take by mouth 4/13/14   Historical Provider, MD     No Known Allergies    Miguel Roa MD  9/42/6777,4:67 PM    Portions of the record may have been created with voice recognition software   Occasional wrong word or "sound a like" substitutions may have occurred due to the inherent limitations of voice recognition software   Read the chart carefully and recognize, using context, where substitutions have occurred

## 2021-02-18 ENCOUNTER — OFFICE VISIT (OUTPATIENT)
Dept: DERMATOLOGY | Facility: CLINIC | Age: 61
End: 2021-02-18
Payer: COMMERCIAL

## 2021-02-18 VITALS — TEMPERATURE: 97.2 F

## 2021-02-18 DIAGNOSIS — L23.9 ALLERGIC CONTACT DERMATITIS, UNSPECIFIED TRIGGER: Primary | ICD-10-CM

## 2021-02-18 PROCEDURE — 99212 OFFICE O/P EST SF 10 MIN: CPT | Performed by: DERMATOLOGY

## 2021-02-18 NOTE — PROGRESS NOTES
[address]  [address]  [place] [address]  [phone]  [phone]     MRN: [id number] : 1960  Encounter: [id number]  Patient Information: Michael Nunez    Subjective:     80-year-old male presents for 72 hour patch test reading from true test   No complaints     Objective:   Temp (!) 97 2 °F (36 2 °C)     Physical Exam:    General Appearance:    Alert, cooperative, no distress   Skin:   Reaction again as noted previously has not changed to both Carba mix and Thiuram mix 2 to 3+ quite indurated no other signs of any reaction at this point     Assessment:     1  Allergic contact dermatitis, unspecified trigger           Plan:   Patient again advised that this is a rubber allergy he needs to completely avoid any products that would contain rubber also suggested off gloves for working and also to have someone else take a look at his back over the weekend to see if any other reactions come up  Will plan follow-up for his continue psoriasis treatment      Prior to Admission medications    Medication Sig Start Date End Date Taking?  Authorizing Provider   amLODIPine (NORVASC) 10 mg tablet Take 10 mg by mouth 17   Historical Provider, MD   atorvastatin (LIPITOR) 20 mg tablet  20   Historical Provider, MD   clobetasol (TEMOVATE) 0 05 % external solution Apply topically 2 (two) times a day    Historical Provider, MD   clobetasol propionate (CLOBEX SPRAY) 0 05 % external spray Apply topically    Historical Provider, MD   dexlansoprazole (DEXILANT) 60 MG capsule Take by mouth 6/8/15   Historical Provider, MD   doxepin (SINEquan) 10 mg/mL solution by Does not apply route 2/4/15   Historical Provider, MD   esomeprazole (HM ESOMEPRAZOLE MAGNESIUM DR) 20 mg capsule Take 40 mg by mouth 17   Historical Provider, MD   fluticasone (CUTIVATE) 0 05 % cream Apply topically 2 (two) times a day To rash till resolved 21   Jamal Welch MD Guselkumab (Tremfya) 100 MG/ML SOSY INJECT 1 SYRINGE UNDER THE SKIN EVERY 8 WEEKS  2/2/21   Donna Christopher MD   losartan-hydrochlorothiazide [facility]) 100-25 MG per tablet TK 1 T PO QD 12/2/20   Historical Provider, MD   Multiple Vitamin (MULTI-VITAMIN DAILY) TABS Take by mouth    Historical Provider, MD   tadalafil (CIALIS) 5 MG tablet Take 5 mg by mouth 5/11/17   Historical Provider, MD   valsartan-hydrochlorothiazide (DIOVAN-HCT) 320-12 5 MG per tablet Take by mouth 4/13/14   Historical Provider, MD     No Known Allergies    Donna Christopher MD  2/18/2021,12:39 PM    Portions of the record may have been created with voice recognition software   Occasional wrong word or "sound a like" substitutions may have occurred due to the inherent limitations of voice recognition software   Read the chart carefully and recognize, using context, where substitutions have occurred

## 2021-02-18 NOTE — PATIENT INSTRUCTIONS
Patient again advised that this is a rubber allergy he needs to completely avoid any products that would contain rubber also suggested off gloves for working and also to have someone else take a look at his back over the weekend to see if any other reactions come up    Will plan follow-up for his continue psoriasis treatment

## 2021-06-15 ENCOUNTER — TELEPHONE (OUTPATIENT)
Dept: DERMATOLOGY | Facility: CLINIC | Age: 61
End: 2021-06-15

## 2021-06-21 ENCOUNTER — OFFICE VISIT (OUTPATIENT)
Dept: DERMATOLOGY | Facility: CLINIC | Age: 61
End: 2021-06-21
Payer: COMMERCIAL

## 2021-06-21 DIAGNOSIS — L40.9 PSORIASIS: ICD-10-CM

## 2021-06-21 DIAGNOSIS — L23.9 ALLERGIC CONTACT DERMATITIS, UNSPECIFIED TRIGGER: Primary | ICD-10-CM

## 2021-06-21 PROCEDURE — 99213 OFFICE O/P EST LOW 20 MIN: CPT | Performed by: DERMATOLOGY

## 2021-06-21 RX ORDER — CLOBETASOL PROPIONATE 0.5 MG/G
OINTMENT TOPICAL 2 TIMES DAILY
Qty: 30 G | Refills: 2 | Status: SHIPPED | OUTPATIENT
Start: 2021-06-21 | End: 2021-06-24 | Stop reason: SDUPTHER

## 2021-06-21 NOTE — PROGRESS NOTES
[address]  [address] [facility] [address]  [phone]  [phone]     MRN: [id number] : 1960  Encounter: [id number]  Patient Information: Samara Carrillo  Chief complaint:  Follow-up for psoriasis and previously noted contact dermatitis    History of present illness:  78-year-old male with previous history psoriasis on Tremfya doing well no problems noted however he still continues to have contact reactions on his wrist also 1 on left back that we had noted previously may be a spot where we had previously patch tested him not sure exactly what substance that may be   No past medical history on file  No past surgical history on file  Social History   Social History     Substance and Sexual Activity   Alcohol Use None     Social History     Substance and Sexual Activity   Drug Use Not on file     Social History     Tobacco Use   Smoking Status Former Smoker   Smokeless Tobacco Never Used     History reviewed  No pertinent family history  Meds/Allergies   No Known Allergies    Meds:  Prior to Admission medications    Medication Sig Start Date End Date Taking?  Authorizing Provider   amLODIPine (NORVASC) 10 mg tablet Take 10 mg by mouth 17   Historical Provider, MD   atorvastatin (LIPITOR) 20 mg tablet  20   Historical Provider, MD   clobetasol (TEMOVATE) 0 05 % external solution Apply topically 2 (two) times a day    Historical Provider, MD   clobetasol propionate (CLOBEX SPRAY) 0 05 % external spray Apply topically    Historical Provider, MD   dexlansoprazole (DEXILANT) 60 MG capsule Take by mouth 6/8/15   Historical Provider, MD   doxepin (SINEquan) 10 mg/mL solution by Does not apply route 2/4/15   Historical Provider, MD   esomeprazole (HM ESOMEPRAZOLE MAGNESIUM DR) 20 mg capsule Take 40 mg by mouth 17   Historical Provider, MD   fluticasone (CUTIVATE) 0 05 % cream Apply topically 2 (two) times a day To rash till resolved 1/7/21   Bharati Elliott MD   Guselkumab (Tremfya) 100 MG/ML SOSY INJECT 1 SYRINGE UNDER THE SKIN EVERY 8 WEEKS  2/2/21   Bharati Elliott MD   losartan-hydrochlorothiazide [facility]) 100-25 MG per tablet TK 1 T PO QD 12/2/20   Historical Provider, MD   Multiple Vitamin (MULTI-VITAMIN DAILY) TABS Take by mouth    Historical Provider, MD   tadalafil (CIALIS) 5 MG tablet Take 5 mg by mouth 5/11/17   Historical Provider, MD   valsartan-hydrochlorothiazide (DIOVAN-HCT) 320-12 5 MG per tablet Take by mouth 4/13/14   Historical Provider, MD       Subjective:     Review of Systems:    General: negative for - chills, fatigue, fever,  weight gain or weight loss  Psychological: negative for - anxiety, behavioral disorder, concentration difficulties, decreased libido, depression, irritability, memory difficulties, mood swings, sleep disturbances or suicidal ideation  ENT: negative for - hearing difficulties , nasal congestion, nasal discharge, oral lesions, sinus pain, sneezing, sore throat  Allergy and Immunology: negative for - hives, insect bite sensitivity,  Hematological and Lymphatic: negative for - bleeding problems, blood clots,bruising, swollen lymph nodes  Endocrine: negative for - hair pattern changes, hot flashes, malaise/lethargy, mood swings, palpitations, polydipsia/polyuria, skin changes, temperature intolerance or unexpected weight change  Respiratory: negative for - cough, hemoptysis, orthopnea, shortness of breath, or wheezing  Cardiovascular: negative for - chest pain, dyspnea on exertion, edema,  Gastrointestinal: negative for - abdominal pain, nausea/vomiting  Genito-Urinary: negative for - dysuria, incontinence, irregular/heavy menses or urinary frequency/urgency  Musculoskeletal: negative for - gait disturbance, joint pain, joint stiffness, joint swelling, muscle pain, muscular weakness  Dermatological:  As in HPI  Neurological: negative for confusion, dizziness, headaches, impaired coordination/balance, memory loss, numbness/tingling, seizures, speech problems, tremors or weakness       Objective: There were no vitals taken for this visit  Physical Exam:    General Appearance:    Alert, cooperative, no distress   Head:    Normocephalic, without obvious abnormality, atraumatic           Skin:   A full skin exam was performed including scalp, head scalp, eyes, ears, nose, lips, neck, chest, axilla, abdomen, back, buttocks, bilateral upper extremities, bilateral lower extremities, hands, feet, fingers, toes, fingernails, and toenails no evidence of any psoriasis at this point this scaling erythematous well-demarcated patches noted on the right wrist also on the left back     Assessment:     1  Allergic contact dermatitis, unspecified trigger  clobetasol (TEMOVATE) 0 05 % ointment   2  Psoriasis  CBC and differential    Comprehensive metabolic panel    Quantiferon TB Gold Plus    CBC and differential    Comprehensive metabolic panel         Plan:   Allergic contact dermatitis patient advise again that if he is in contact with any rubber products this could easily continue his breakouts on his wrist will treat with topical steroids interesting that he has a persistent reaction on his back not close clear what would cause this however a this would be in the area of patch tests numbers 25-36  Psoriasis under good control will continue with Aleta Price MD  2/66/1844,1:83 PM    Portions of the record may have been created with voice recognition software   Occasional wrong word or "sound a like" substitutions may have occurred due to the inherent limitations of voice recognition software   Read the chart carefully and recognize, using context, where substitutions have occurred

## 2021-06-21 NOTE — PATIENT INSTRUCTIONS
Allergic contact dermatitis patient advise again that if he is in contact with any rubber products this could easily continue his breakouts on his wrist will treat with topical steroids interesting that he has a persistent reaction on his back not close clear what would cause this however a this would be in the area of patch tests numbers 25-36  Psoriasis under good control will continue with Tremfya

## 2021-06-24 DIAGNOSIS — L23.9 ALLERGIC CONTACT DERMATITIS, UNSPECIFIED TRIGGER: ICD-10-CM

## 2021-06-24 RX ORDER — CLOBETASOL PROPIONATE 0.5 MG/G
OINTMENT TOPICAL 2 TIMES DAILY
Qty: 30 G | Refills: 2 | Status: SHIPPED | OUTPATIENT
Start: 2021-06-24

## 2021-07-15 DIAGNOSIS — L40.9 PSORIASIS: ICD-10-CM

## 2021-07-15 RX ORDER — GUSELKUMAB 100 MG/ML
INJECTION SUBCUTANEOUS
Qty: 1 ML | Refills: 1 | Status: SHIPPED | OUTPATIENT
Start: 2021-07-15 | End: 2021-12-01

## 2021-10-13 ENCOUNTER — TELEPHONE (OUTPATIENT)
Dept: DERMATOLOGY | Facility: CLINIC | Age: 61
End: 2021-10-13

## 2021-10-25 ENCOUNTER — CLINICAL SUPPORT (OUTPATIENT)
Dept: DERMATOLOGY | Facility: CLINIC | Age: 61
End: 2021-10-25
Payer: COMMERCIAL

## 2021-10-25 DIAGNOSIS — L40.9 PSORIASIS: Primary | ICD-10-CM

## 2021-10-25 DIAGNOSIS — Z13.89 SCREENING FOR SKIN CONDITION: ICD-10-CM

## 2021-10-25 PROCEDURE — 99213 OFFICE O/P EST LOW 20 MIN: CPT | Performed by: DERMATOLOGY

## 2021-12-01 DIAGNOSIS — L40.9 PSORIASIS: ICD-10-CM

## 2021-12-01 RX ORDER — GUSELKUMAB 100 MG/ML
INJECTION SUBCUTANEOUS
Qty: 1 ML | Refills: 1 | Status: SHIPPED | OUTPATIENT
Start: 2021-12-01 | End: 2021-12-15 | Stop reason: SDUPTHER

## 2021-12-15 ENCOUNTER — TELEPHONE (OUTPATIENT)
Dept: DERMATOLOGY | Facility: CLINIC | Age: 61
End: 2021-12-15

## 2021-12-15 DIAGNOSIS — L40.9 PSORIASIS: ICD-10-CM

## 2021-12-15 RX ORDER — GUSELKUMAB 100 MG/ML
INJECTION SUBCUTANEOUS
Qty: 1 ML | Refills: 3 | Status: SHIPPED | OUTPATIENT
Start: 2021-12-15 | End: 2021-12-22 | Stop reason: SDUPTHER

## 2021-12-22 ENCOUNTER — TELEPHONE (OUTPATIENT)
Dept: DERMATOLOGY | Facility: CLINIC | Age: 61
End: 2021-12-22

## 2021-12-22 DIAGNOSIS — L40.9 PSORIASIS: ICD-10-CM

## 2021-12-22 RX ORDER — GUSELKUMAB 100 MG/ML
INJECTION SUBCUTANEOUS
Qty: 1 ML | Refills: 3 | Status: SHIPPED | OUTPATIENT
Start: 2021-12-22

## 2022-05-23 ENCOUNTER — OFFICE VISIT (OUTPATIENT)
Dept: DERMATOLOGY | Facility: CLINIC | Age: 62
End: 2022-05-23
Payer: COMMERCIAL

## 2022-05-23 VITALS — BODY MASS INDEX: 32.18 KG/M2 | HEIGHT: 67 IN | WEIGHT: 205 LBS

## 2022-05-23 DIAGNOSIS — L40.9 PSORIASIS: Primary | ICD-10-CM

## 2022-05-23 DIAGNOSIS — L23.7 POISON IVY DERMATITIS: ICD-10-CM

## 2022-05-23 DIAGNOSIS — Z13.89 SCREENING FOR SKIN CONDITION: ICD-10-CM

## 2022-05-23 PROCEDURE — 99213 OFFICE O/P EST LOW 20 MIN: CPT | Performed by: DERMATOLOGY

## 2022-05-23 RX ORDER — CLOBETASOL PROPIONATE 0.5 MG/G
CREAM TOPICAL 2 TIMES DAILY
Qty: 30 G | Refills: 1 | Status: SHIPPED | OUTPATIENT
Start: 2022-05-23

## 2022-05-23 NOTE — PATIENT INSTRUCTIONS
Psoriasis under good control continue Tremfya patient will return 2 months at which point we will repeat his blood work  Poison ivy dermatitis we discussed the concept of this process he a patient knows where his exposure was at work will go ahead treat with topical steroid  Nothing else of concern noted follow-up in 4 months

## 2022-07-01 ENCOUNTER — TELEPHONE (OUTPATIENT)
Dept: DERMATOLOGY | Facility: CLINIC | Age: 62
End: 2022-07-01

## 2022-07-01 NOTE — TELEPHONE ENCOUNTER
[facility] called stating they need a PA for the Tremfya  Asking for a call back to complete       CB # [phone] ext [phone]

## 2022-09-28 ENCOUNTER — OFFICE VISIT (OUTPATIENT)
Dept: DERMATOLOGY | Facility: CLINIC | Age: 62
End: 2022-09-28
Payer: COMMERCIAL

## 2022-09-28 VITALS — HEIGHT: 67 IN | BODY MASS INDEX: 32.18 KG/M2 | WEIGHT: 205 LBS

## 2022-09-28 DIAGNOSIS — L40.9 PSORIASIS: Primary | ICD-10-CM

## 2022-09-28 DIAGNOSIS — Z13.89 SCREENING FOR SKIN CONDITION: ICD-10-CM

## 2022-09-28 PROCEDURE — 99213 OFFICE O/P EST LOW 20 MIN: CPT | Performed by: DERMATOLOGY

## 2022-09-28 NOTE — PROGRESS NOTES
[address]  [place] [facility]  [address] [id number] follow-up  [phone]     MRN: [id number] : 1960  Encounter: [id number]  Patient Information: Alyssa Edwards  Chief complaint: psoriasis follow-up    History of present illness:  52-year-old male presents for follow-up secondary to his psoriasis on Tremfya  No problems noted  No past medical history on file  No past surgical history on file  Social History   Social History     Substance and Sexual Activity   Alcohol Use None     Social History     Substance and Sexual Activity   Drug Use Not on file     Social History     Tobacco Use   Smoking Status Former Smoker   Smokeless Tobacco Never Used     No family history on file  Meds/Allergies   No Known Allergies    Meds:  Prior to Admission medications    Medication Sig Start Date End Date Taking? Authorizing Provider   amLODIPine (NORVASC) 10 mg tablet Take 10 mg by mouth 17  Yes Historical Provider, MD   atorvastatin (LIPITOR) 20 mg tablet  20  Yes Historical Provider, MD   clobetasol (TEMOVATE) 0 05 % cream Apply topically 2 (two) times a day To poison ivy rash till resolved 22  Yes Yuliana Shelley MD   clobetasol (TEMOVATE) 0 05 % external solution Apply topically 2 (two) times a day   Yes Historical Provider, MD   clobetasol (TEMOVATE) 0 05 % ointment Apply topically 2 (two) times a day To rash on right wrist till resolved also for the back 21  Yes Yuliana Shelley MD   clobetasol propionate (CLOBEX) 0 05 % external spray Apply topically   Yes Historical Provider, MD   Guselkumab (Tremfya) 100 MG/ML SOSY INJECT 1 SYRINGE UNDER THE SKIN EVERY 8 WEEKS   21  Yes Yuliana Shelley MD   losartan-hydrochlorothiazide (HYZAAR) 100-25 MG per tablet TK 1 T PO QD 20  Yes Historical Provider, MD   Multiple Vitamin (MULTI-VITAMIN DAILY) TABS Take by mouth   Yes Historical Provider, MD   tadalafil (CIALIS) 5 MG tablet Take 5 mg by mouth 5/11/17  Yes Historical Provider, MD   valsartan-hydrochlorothiazide (DIOVAN-HCT) 320-12 5 MG per tablet Take by mouth 4/13/14  Yes Historical Provider, MD   dexlansoprazole (DEXILANT) 60 MG capsule Take by mouth  Patient not taking: No sig reported 6/8/15   Historical Provider, MD   doxepin (SINEquan) 10 mg/mL solution by Does not apply route  Patient not taking: No sig reported 2/4/15   Historical Provider, MD   esomeprazole (NexIUM) 20 mg capsule Take 40 mg by mouth  Patient not taking: No sig reported 5/11/17   Historical Provider, MD   fluticasone (CUTIVATE) 0 05 % cream Apply topically 2 (two) times a day To rash till resolved  Patient not taking: No sig reported 1/7/21   Gopal Pinto MD       Subjective:     Review of Systems:    General: negative for - chills, fatigue, fever,  weight gain or weight loss  Psychological: negative for - anxiety, behavioral disorder, concentration difficulties, decreased libido, depression, irritability, memory difficulties, mood swings, sleep disturbances or suicidal ideation  ENT: negative for - hearing difficulties , nasal congestion, nasal discharge, oral lesions, sinus pain, sneezing, sore throat  Allergy and Immunology: negative for - hives, insect bite sensitivity,  Hematological and Lymphatic: negative for - bleeding problems, blood clots,bruising, swollen lymph nodes  Endocrine: negative for - hair pattern changes, hot flashes, malaise/lethargy, mood swings, palpitations, polydipsia/polyuria, skin changes, temperature intolerance or unexpected weight change  Respiratory: negative for - cough, hemoptysis, orthopnea, shortness of breath, or wheezing  Cardiovascular: negative for - chest pain, dyspnea on exertion, edema,  Gastrointestinal: negative for - abdominal pain, nausea/vomiting  Genito-Urinary: negative for - dysuria, incontinence, irregular/heavy menses or urinary frequency/urgency  Musculoskeletal: negative for - gait disturbance, joint pain, joint stiffness, joint swelling, muscle pain, muscular weakness  Dermatological:  As in HPI  Neurological: negative for confusion, dizziness, headaches, impaired coordination/balance, memory loss, numbness/tingling, seizures, speech problems, tremors or weakness       Objective:   Ht 5' 7" (1 702 m)   Wt 93 kg (205 lb)   BMI 32 11 kg/m²     Physical Exam:    General Appearance:    Alert, cooperative, no distress   Head:    Normocephalic, without obvious abnormality, atraumatic           Skin:   A full skin exam was performed including scalp, head scalp, eyes, ears, nose, lips, neck, chest, axilla, abdomen, back, buttocks, bilateral upper extremities, bilateral lower extremities, hands, feet, fingers, toes, fingernails, and toenails no active psoriasis noted nothing else of concern noted on complete exam     Assessment:     1  Psoriasis  Quantiferon TB Gold Plus   2  Screening for skin condition           Plan:   Psoriasis under excellent control will get his Tremfya injection and we will plan follow-up again in 6 months will repeat his blood work at this time  Screening for Dermatologic Disorders: Nothing else of concern noted on complete exam follow up in 1 year       Albaro Vuong  9/28/2022,4:54 PM    Portions of the record may have been created with voice recognition software   Occasional wrong word or "sound a like" substitutions may have occurred due to the inherent limitations of voice recognition software   Read the chart carefully and recognize, using context, where substitutions have occurred

## 2022-09-28 NOTE — PATIENT INSTRUCTIONS
Psoriasis under excellent control will get his Tremfya injection and we will plan follow-up again in 6 months will repeat his blood work at this time  Screening for Dermatologic Disorders: Nothing else of concern noted on complete exam follow up in 1 year

## 2022-10-06 DIAGNOSIS — L40.9 PSORIASIS: ICD-10-CM

## 2022-10-06 RX ORDER — GUSELKUMAB 100 MG/ML
INJECTION SUBCUTANEOUS
Qty: 1 ML | Refills: 1 | Status: SHIPPED | OUTPATIENT
Start: 2022-10-06

## 2023-01-24 ENCOUNTER — TELEPHONE (OUTPATIENT)
Dept: DERMATOLOGY | Facility: CLINIC | Age: 63
End: 2023-01-24

## 2023-01-24 NOTE — TELEPHONE ENCOUNTER
Patient called and said that a few years ago he had a reaction to rubber and that you prescribed something  He went to the dentist and the mask they put on his face gave him the same reaction  He would like to know if you can call something in for him

## 2023-01-25 DIAGNOSIS — L23.9 ALLERGIC CONTACT DERMATITIS, UNSPECIFIED TRIGGER: ICD-10-CM

## 2023-01-25 RX ORDER — FLUTICASONE PROPIONATE 0.05 %
CREAM (GRAM) TOPICAL DAILY
Qty: 30 G | Refills: 1 | Status: SHIPPED | OUTPATIENT
Start: 2023-01-25

## 2023-03-27 DIAGNOSIS — L40.9 PSORIASIS: ICD-10-CM

## 2023-03-27 RX ORDER — GUSELKUMAB 100 MG/ML
INJECTION SUBCUTANEOUS
Qty: 1 ML | Refills: 3 | Status: SHIPPED | OUTPATIENT
Start: 2023-03-27

## 2023-05-04 ENCOUNTER — TELEPHONE (OUTPATIENT)
Age: 63
End: 2023-05-04

## 2023-05-04 NOTE — TELEPHONE ENCOUNTER
Pt left vm, would like to speak to someone from Dr Jose Manuel Mora office      Please call back  [phone]

## 2023-06-12 ENCOUNTER — OFFICE VISIT (OUTPATIENT)
Age: 63
End: 2023-06-12

## 2023-06-12 ENCOUNTER — APPOINTMENT (OUTPATIENT)
Age: 63
End: 2023-06-12
Payer: COMMERCIAL

## 2023-06-12 VITALS — WEIGHT: 208 LBS | HEIGHT: 67 IN | BODY MASS INDEX: 32.65 KG/M2

## 2023-06-12 DIAGNOSIS — L72.0 EPIDERMAL INCLUSION CYST: ICD-10-CM

## 2023-06-12 DIAGNOSIS — Z13.89 SCREENING FOR SKIN CONDITION: Primary | ICD-10-CM

## 2023-06-12 DIAGNOSIS — L40.9 PSORIASIS: ICD-10-CM

## 2023-06-12 PROCEDURE — 80053 COMPREHEN METABOLIC PANEL: CPT

## 2023-06-12 PROCEDURE — 85025 COMPLETE CBC W/AUTO DIFF WBC: CPT

## 2023-06-12 PROCEDURE — 36415 COLL VENOUS BLD VENIPUNCTURE: CPT

## 2023-06-12 RX ORDER — OMEPRAZOLE 20 MG/1
CAPSULE, DELAYED RELEASE ORAL
COMMUNITY
Start: 2023-05-30

## 2023-06-12 RX ORDER — ALBUTEROL SULFATE 90 UG/1
AEROSOL, METERED RESPIRATORY (INHALATION)
COMMUNITY
Start: 2023-04-07

## 2023-06-12 RX ORDER — FLUTICASONE FUROATE AND VILANTEROL TRIFENATATE 100; 25 UG/1; UG/1
POWDER RESPIRATORY (INHALATION)
COMMUNITY
Start: 2023-06-05

## 2023-06-12 NOTE — PROGRESS NOTES
"[facility]     Patient Name: Yamilet Callahan  Encounter Date: June 12, 2023     Have you been cared for by a [place] [address] Dermatologist in the last 3 years and, if so, which description applies to you? Yes  I have been here within the last 3 years, and my medical history has NOT changed since that time  I am MALE/not capable of bearing children  REVIEW OF SYSTEMS:  Have you recently had or currently have any of the following? · No changes in my recent health  PAST MEDICAL HISTORY:  Have you personally ever had or currently have any of the following? If \"YES,\" then please provide more detail  · No changes in my medical history  FAMILY HISTORY:  Any \"first degree relatives\" (parent, brother, sister, or child) with the following? • No changes in my family's known health  PATIENT EXPERIENCE:    • Do you want the Dermatologist to perform a COMPLETE skin exam today including a clinical examination under the \"bra and underwear\" areas? Yes  • If necessary, do we have your permission to call and leave a detailed message on your Preferred Phone number that includes your specific medical information?   Yes      No Known Allergies   Current Outpatient Medications:   •  amLODIPine (NORVASC) 10 mg tablet, Take 10 mg by mouth, Disp: , Rfl:   •  atorvastatin (LIPITOR) 20 mg tablet, , Disp: , Rfl:   •  clobetasol (TEMOVATE) 0 05 % cream, Apply topically 2 (two) times a day To poison ivy rash till resolved, Disp: 30 g, Rfl: 1  •  losartan-hydrochlorothiazide (HYZAAR) 100-25 MG per tablet, TK 1 T PO QD, Disp: , Rfl:   •  Multiple Vitamin (MULTI-VITAMIN DAILY) TABS, Take by mouth, Disp: , Rfl:   •  tadalafil (CIALIS) 5 MG tablet, Take 5 mg by mouth, Disp: , Rfl:   •  Tremfya 100 MG/ML SOSY, INJECT 1 SYRINGE UNDER THE SKIN EVERY 8 WEEKS , Disp: 1 mL, Rfl: 3  •  clobetasol (TEMOVATE) 0 05 % external solution, Apply topically 2 (two) times a day (Patient not taking: Reported on 6/12/2023), Disp: , " "Rfl:   •  clobetasol (TEMOVATE) 0 05 % ointment, Apply topically 2 (two) times a day To rash on right wrist till resolved also for the back (Patient not taking: Reported on 6/12/2023), Disp: 30 g, Rfl: 2  •  clobetasol propionate (CLOBEX) 0 05 % external spray, Apply topically (Patient not taking: Reported on 6/12/2023), Disp: , Rfl:   •  dexlansoprazole (DEXILANT) 60 MG capsule, Take by mouth (Patient not taking: No sig reported), Disp: , Rfl:   •  doxepin (SINEquan) 10 mg/mL solution, by Does not apply route (Patient not taking: No sig reported), Disp: , Rfl:   •  esomeprazole (NexIUM) 20 mg capsule, Take 40 mg by mouth (Patient not taking: No sig reported), Disp: , Rfl:   •  fluticasone (CUTIVATE) 0 05 % cream, Apply topically daily To rash till resolved (Patient not taking: Reported on 6/12/2023), Disp: 30 g, Rfl: 1  •  valsartan-hydrochlorothiazide (DIOVAN-HCT) 320-12 5 MG per tablet, Take by mouth (Patient not taking: Reported on 6/12/2023), Disp: , Rfl:           • Whom besides the patient is providing clinical information about today's encounter?   o NO ADDITIONAL HISTORIAN (patient alone provided history)    Physical Exam and Assessment/Plan by Diagnosis:    MELANOCYTIC NEVI (\"Moles\")    Physical Exam:  • Anatomic Location Affected: Mostly on sun-exposed areas of the ***  • Morphological Description:  Scattered, 1-4mm round to ovoid, symmetrical-appearing, even bordered, skin colored to dark brown macules/papules, mostly in sun-exposed areas  • Pertinent Positives:  • Pertinent Negatives:     Additional History of Present Condition:  ***    Assessment and Plan:  Based on a thorough discussion of this condition and the management approach to it (including a comprehensive discussion of the known risks, side effects and potential benefits of treatment), the patient (family) agrees to implement the following specific plan:  • Provided handout with information regarding the ABCDE's of moles   • Recommend routine " "skin exams every ***   • Sun avoidance, protective clothing (known as UPF clothing), and the use of at least SPF 30 sunscreens is advised  Sunscreen should be reapplied every two hours when outside  • ***    SEBORRHEIC KERATOSIS; NON-INFLAMED    Physical Exam:  • Anatomic Location Affected:  scattered across trunk, extremities, *** face  • Morphological Description:  Flat and raised, waxy, smooth to warty textured, yellow to brownish-grey to dark brown to blackish, discrete, \"stuck-on\" appearing papules  • Pertinent Positives:  • Pertinent Negatives: Additional History of Present Condition:  Patient reports new bumps on the skin  Denies itch, burn, pain, bleeding or ulceration  Present constantly; nothing seems to make it worse or better  No prior treatment  Assessment and Plan:  Based on a thorough discussion of this condition and the management approach to it (including a comprehensive discussion of the known risks, side effects and potential benefits of treatment), the patient (family) agrees to implement the following specific plan:  • Reassured benign  • ***      ANGIOMA (\"CHERRY ANGIOMA\")    Physical Exam:  • Anatomic Location: scattered across sun exposed areas of the trunk and extremities   • Morphologic Description: Firm red to reddish-blue discrete papules  • Pertinent Positives:  • Pertinent Negatives: Additional History of Present Condition:  Present on exam  ***    Assessment and Plan:  • Reassured benign  • ***      PSORIASIS    Physical Exam:  • Anatomic Location Affected:  ***  • Morphological Description:  ***  • Severity: {Mild/Moderate/Severe:91710}  • Body Percent Affected: ***  • Pertinent Positives:  • Pertinent Negatives:     Additional History of Present Condition:  ***    Assessment and Plan:  Based on a thorough discussion of this condition and the management approach to it (including a comprehensive discussion of the known risks, side effects and potential benefits of " treatment), the patient (family) agrees to implement the following specific plan:  • ***     Psoriasis is a chronic inflammatory condition that causes the body to make new skin cells in days rather than weeks  As these cells pile up on the surface of the skin, you may see thick, scaly patches of thickened skin  Psoriasis affects 2-4% of males and females  It can start at any age including childhood, with peaks of onset at 15-25 years and 50-60 years  It tends to persist lifelong, fluctuating in extent and severity  It is particularly common in Caucasians but may affect people of any race  About one-third of patients with psoriasis have family members with psoriasis  Psoriasis is multifactorial  It is classified as an immune-mediated inflammatory disease (IMID)  Genetic factors are important and influence the type of psoriasis and response to treatment  What are the signs and symptoms of psoriasis? There are many different types of psoriasis that each have present uniquely  The types of psoriasis include:    Plaque psoriasis: About 80% to 90% of people who have psoriasis develop this type  When plaque psoriasis appears, you may see:  Plaque psoriasis usually presents with symmetrically distributed, red, scaly plaques with well-defined edges  The scale is typically silvery white, except in skin folds where the plaques often appear shiny and they may have a moist peeling surface  The most common sites are scalp, elbows and knees, but any part of the skin can be involved  The plaques are usually very persistent without treatment  Itch is mostly mild but may be severe in some patients, leading to scratching and lichenification (thickened leathery skin with increased skin markings)  Painful skin cracks or fissures may occur  When psoriatic plaques clear up, they may leave brown or pale marks that can be expected to fade over several months      Guttate psoriasis: When someone gets this type of psoriasis, you often see tiny bumps appear on the skin quite suddenly  The bumps tend to cover much of the torso, legs, and arms  Sometimes, the bumps also develop on the face, scalp, and ears  No matter where they appear, the bumps tend to be:   • Small and scaly  • Ocate-colored to pink  • Temporary, clearing in a few weeks or months without treatment  When guttate psoriasis clears, it may never return  Why this happens is still a bit of a mystery  Guttate psoriasis tends to develop in children and young adults who've had an infection, such as strep throat  It's possible that when the infection clears so does guttate psoriasis  It's also possible to have:  • Guttate psoriasis for life  • See the guttate psoriasis clear and plaque psoriasis develop later in life  • Plaque psoriasis when you develop guttate psoriasis  There's no way to predict what will happen after the first flare-up of guttate psoriasis clears  Inverse psoriasis: This type of psoriasis develops in areas where skin touches skin, such as the armpits, genitals, and crease of the buttocks  Where the inverse psoriasis appears, you're likely to notice:  • Smooth, red patches of skin that look raw  • Little, if any, silvery-white coating  • Sore or painful skin  Other names for this type of psoriasis are intertriginous psoriasis or flexural psoriasis  Pustular psoriasis: This type of psoriasis causes pus-filled bumps that usually appear only on the feet and hands  While the pus-filled bumps may look like an infection, the skin is not infected  The bumps don't contain bacteria or anything else that could cause an infection    Where pustular psoriasis appears, you tend to notice:  • Red, swollen skin that is dotted with pus-filled bumps  • Extremely sore or painful skin  • Brown dots (and sometimes scale) appear as the pus-filled bumps dry  Pustular psoriasis can make just about any activity that requires your hands or feet, such as typing or walking, unbearably painful  Pustular psoriasis (generalized): Serious and life-threatening, this rare type of psoriasis causes pus-filled bumps to develop on much of the skin  Also called von Zumbusch psoriasis, a flare-up causes this sequence of events:  1  Skin on most of the body suddenly turns dry, red, and tender  2  Within hours, pus-filled bumps cover most of the skin  3  Often within a day, the pus-filled bumps break open and pools of pus leak onto the skin  4  As the pus dries (usually within 24 to 48 hours), the skin dries out and peels (as shown in this picture)  5  When the dried skin peels off, you see a smooth, glazed surface  6  In a few days or weeks, you may see a new crop of pus-filled bumps covering most of the skin, as the cycle repeats itself  Anyone with pustular psoriasis also feels very sick, and may develop a fever, headache, muscle weakness, and other symptoms  Medical care is often necessary to save the person's life  Erythrodermic psoriasis: Serious and life-threatening, this type of psoriasis requires immediate medical care  When someone develops erythrodermic psoriasis, you may notice:  • Skin on most of the body looks burnt  • Chills, fever, and the person looks extremely ill  • Muscle weakness, a rapid pulse, and severe itch  The person may also be unable to keep warm, so hypothermia can set in quickly  Most people who develop this type of psoriasis already have another type of psoriasis  Before developing erythrodermic psoriasis, they often notice that their psoriasis is worsening or not improving with treatment  If you notice either of these happening, see a board-certified dermatologist     Nails    Nail psoriasis: With any type of psoriasis, you may see changes to your fingernails or toenails  About half of the people who have plaque psoriasis see signs of psoriasis on their fingernails at some point2    When psoriasis affects the nails, you may notice:  • Tiny dents in your nails (called “nail pits”)  • White, yellow, or brown discoloration under one or more nails  • Crumbling, rough nails  • A nail lifting up so that it's no longer attached  • Buildup of skin cells beneath one or more nails, which lifts up the nail  Treatment and proper nail care can help you control nail psoriasis  Psoriatic arthritis: If you have psoriasis, it's important to pay attention to your joints  Some people who have psoriasis develop a type of arthritis called psoriatic arthritis  This is more likely to occur if you have severe psoriasis  Most people notice psoriasis on their skin years before they develop psoriatic arthritis  It's also possible to get psoriatic arthritis before psoriasis, but this is less common  When psoriatic arthritis develops, the signs can be subtle  At first, you may notice:  • A swollen and tender joint, especially in a finger or toe  • Heel pain  • Swelling on the back of your leg, just above your heel  • Stiffness in the morning that fades during the day  Like psoriasis, psoriatic arthritis cannot be cured  Treatment can prevent psoriatic arthritis from worsening, which is important  Allowed to progress, psoriatic arthritis can become disabling  Diagnosis and treatment of psoriasis   Psoriasis is usually diagnosed by clinical features, and skin biopsy if necessary  It is important to decrease factors that aggravate psoriasis  These include treating streptococcal infections, minimizing skin injuries, avoiding sun exposure if it exacerbates psoriasis, smoking, alcohol usage, decreasing stress, and maintaining an optimal body weight  Certain medications such as lithium, beta blockers, antimalarials, and NSAIDs have also been implicated  Suddenly stopping oral steroids or strong topical steroids can cause rebound disease  There are many categories of psoriasis treatments available  Topical therapy  Mild psoriasis is generally treated with topical agents alone   Which treatment is selected may depend on body site, extent and severity of psoriasis  • Emollients  • Coal tar preparations  • Dithranol  • Salicylic acid  • Vitamin D analogue (calcipotriol)  • Topical corticosteroids  • Calcineurin inhibitor (tacrolimus, pimecrolimus)  Phototherapy  Most psoriasis centres offer phototherapy with ultraviolet (UV) radiation, often in combination with topical or systemic agents  Types of phototherapy include  • Narrowband UVB  • Broadband UVB  • Photochemotherapy (PUVA)  • Targeted phototherapy  Systemic therapy  Moderate to severe psoriasis warrants treatment with a systemic agent and/or phototherapy  The most common treatments are:  • Methotrexate  • Ciclosporin  • Acitretin  Other medicines occasionally used for psoriasis include:  • Mycophenolate  • Apremilast  • Hydroxyurea  • Azathioprine  • 6-mercaptopurine  Systemic corticosteroids are best avoided due to a risk of severe withdrawal flare of psoriasis and adverse effects  Biologics or targeted therapies are reserved for conventional treatment-resistant severe psoriasis, mainly because of expense, as side effects compare favorably with other systemic agents  These include:  • Anti-tumour necrosis factor-alpha antagonists (anti-TNF?) infliximab, adalimumab and etanercept  • The interleukin (IL)-12/23 antagonist ustekinumab  • IL-17 antagonists such as secukinumab  Many other monoclonal antibodies are under investigation in the treatment of psoriasis              Scribe Attestation    I,:  Donovan Richey MA am acting as a scribe while in the presence of the attending physician :       I,:  Cary Kidd MD personally performed the services described in this documentation    as scribed in my presence :

## 2023-06-12 NOTE — PROGRESS NOTES
"[facility]     Patient Name: London Stroud  Encounter Date: 6/12/2023     Have you been cared for by a [place] [id number] Dermatologist in the last 3 years and, if so, which description applies to you? Yes  I have been here within the last 3 years, and my medical history has NOT changed since that time  I am MALE/not capable of bearing children  REVIEW OF SYSTEMS:  Have you recently had or currently have any of the following? · No changes in my recent health  PAST MEDICAL HISTORY:  Have you personally ever had or currently have any of the following? If \"YES,\" then please provide more detail  · No changes in my medical history  FAMILY HISTORY:  Any \"first degree relatives\" (parent, brother, sister, or child) with the following? • No changes in my family's known health  PATIENT EXPERIENCE:    • Do you want the Dermatologist to perform a COMPLETE skin exam today including a clinical examination under the \"bra and underwear\" areas? NO  • If necessary, do we have your permission to call and leave a detailed message on your Preferred Phone number that includes your specific medical information?   Yes      No Known Allergies   Current Outpatient Medications:   •  albuterol (PROVENTIL HFA,VENTOLIN HFA) 90 mcg/act inhaler, 2 PUFF USING INHALER EVERY FOUR TO SIX HOURS AS NEEDED, Disp: , Rfl:   •  amLODIPine (NORVASC) 10 mg tablet, Take 10 mg by mouth, Disp: , Rfl:   •  atorvastatin (LIPITOR) 20 mg tablet, , Disp: , Rfl:   •  Breo Ellipta 100-25 MCG/ACT inhaler, TAKE 1 PUFF EVERY DAY AS DIRECTED RINSE AFTER USE, Disp: , Rfl:   •  clobetasol (TEMOVATE) 0 05 % cream, Apply topically 2 (two) times a day To poison ivy rash till resolved, Disp: 30 g, Rfl: 1  •  losartan-hydrochlorothiazide (HYZAAR) 100-25 MG per tablet, TK 1 T PO QD, Disp: , Rfl:   •  Multiple Vitamin (MULTI-VITAMIN DAILY) TABS, Take by mouth, Disp: , Rfl:   •  omeprazole (PriLOSEC) 20 mg delayed release capsule, , Disp: , Rfl: " •  tadalafil (CIALIS) 5 MG tablet, Take 5 mg by mouth, Disp: , Rfl:   •  Tremfya 100 MG/ML SOSY, INJECT 1 SYRINGE UNDER THE SKIN EVERY 8 WEEKS , Disp: 1 mL, Rfl: 3  •  clobetasol (TEMOVATE) 0 05 % external solution, Apply topically 2 (two) times a day (Patient not taking: Reported on 6/12/2023), Disp: , Rfl:   •  clobetasol (TEMOVATE) 0 05 % ointment, Apply topically 2 (two) times a day To rash on right wrist till resolved also for the back (Patient not taking: Reported on 6/12/2023), Disp: 30 g, Rfl: 2  •  clobetasol propionate (CLOBEX) 0 05 % external spray, Apply topically (Patient not taking: Reported on 6/12/2023), Disp: , Rfl:   •  dexlansoprazole (DEXILANT) 60 MG capsule, Take by mouth (Patient not taking: Reported on 10/25/2021), Disp: , Rfl:   •  doxepin (SINEquan) 10 mg/mL solution, by Does not apply route (Patient not taking: Reported on 10/25/2021), Disp: , Rfl:   •  esomeprazole (NexIUM) 20 mg capsule, Take 40 mg by mouth (Patient not taking: Reported on 10/25/2021), Disp: , Rfl:   •  fluticasone (CUTIVATE) 0 05 % cream, Apply topically daily To rash till resolved (Patient not taking: Reported on 6/12/2023), Disp: 30 g, Rfl: 1  •  valsartan-hydrochlorothiazide (DIOVAN-HCT) 320-12 5 MG per tablet, Take by mouth (Patient not taking: Reported on 6/12/2023), Disp: , Rfl:           • Whom besides the patient is providing clinical information about today's encounter?   o NO ADDITIONAL HISTORIAN (patient alone provided history)    Physical Exam and Assessment/Plan by Diagnosis:      FOLLOW PSORIASIS    Physical Exam:  • Anatomic Location Affected:  Clear on examin  • Morphological Description:    • Severity: N/A  • Body Percent Affected: 0%  • Pertinent Positives:  • Pertinent Negatives: Additional History of Present Condition:  Last visit 9/28/2022  Patient on Tremfya injections       Assessment and Plan:  Based on a thorough discussion of this condition and the management approach to it (including a comprehensive discussion of the known risks, side effects and potential benefits of treatment), the patient (family) agrees to implement the following specific plan:  • Continue Tremfya Injections  • Complete blood work      Psoriasis is a chronic inflammatory condition that causes the body to make new skin cells in days rather than weeks  As these cells pile up on the surface of the skin, you may see thick, scaly patches of thickened skin  Psoriasis affects 2-4% of males and females  It can start at any age including childhood, with peaks of onset at 15-25 years and 50-60 years  It tends to persist lifelong, fluctuating in extent and severity  It is particularly common in Caucasians but may affect people of any race  About one-third of patients with psoriasis have family members with psoriasis  Psoriasis is multifactorial  It is classified as an immune-mediated inflammatory disease (IMID)  Genetic factors are important and influence the type of psoriasis and response to treatment  What are the signs and symptoms of psoriasis? There are many different types of psoriasis that each have present uniquely  The types of psoriasis include:    Plaque psoriasis: About 80% to 90% of people who have psoriasis develop this type  When plaque psoriasis appears, you may see:  Plaque psoriasis usually presents with symmetrically distributed, red, scaly plaques with well-defined edges  The scale is typically silvery white, except in skin folds where the plaques often appear shiny and they may have a moist peeling surface  The most common sites are scalp, elbows and knees, but any part of the skin can be involved  The plaques are usually very persistent without treatment  Itch is mostly mild but may be severe in some patients, leading to scratching and lichenification (thickened leathery skin with increased skin markings)  Painful skin cracks or fissures may occur    When psoriatic plaques clear up, they may leave brown or pale marks that can be expected to fade over several months  Guttate psoriasis: When someone gets this type of psoriasis, you often see tiny bumps appear on the skin quite suddenly  The bumps tend to cover much of the torso, legs, and arms  Sometimes, the bumps also develop on the face, scalp, and ears  No matter where they appear, the bumps tend to be:   • Small and scaly  • Stockwell-colored to pink  • Temporary, clearing in a few weeks or months without treatment  When guttate psoriasis clears, it may never return  Why this happens is still a bit of a mystery  Guttate psoriasis tends to develop in children and young adults who've had an infection, such as strep throat  It's possible that when the infection clears so does guttate psoriasis  It's also possible to have:  • Guttate psoriasis for life  • See the guttate psoriasis clear and plaque psoriasis develop later in life  • Plaque psoriasis when you develop guttate psoriasis  There's no way to predict what will happen after the first flare-up of guttate psoriasis clears  Inverse psoriasis: This type of psoriasis develops in areas where skin touches skin, such as the armpits, genitals, and crease of the buttocks  Where the inverse psoriasis appears, you're likely to notice:  • Smooth, red patches of skin that look raw  • Little, if any, silvery-white coating  • Sore or painful skin  Other names for this type of psoriasis are intertriginous psoriasis or flexural psoriasis  Pustular psoriasis: This type of psoriasis causes pus-filled bumps that usually appear only on the feet and hands  While the pus-filled bumps may look like an infection, the skin is not infected  The bumps don't contain bacteria or anything else that could cause an infection    Where pustular psoriasis appears, you tend to notice:  • Red, swollen skin that is dotted with pus-filled bumps  • Extremely sore or painful skin  • Brown dots (and sometimes scale) appear as the pus-filled bumps dry  Pustular psoriasis can make just about any activity that requires your hands or feet, such as typing or walking, unbearably painful  Pustular psoriasis (generalized): Serious and life-threatening, this rare type of psoriasis causes pus-filled bumps to develop on much of the skin  Also called von Zumbusch psoriasis, a flare-up causes this sequence of events:  1  Skin on most of the body suddenly turns dry, red, and tender  2  Within hours, pus-filled bumps cover most of the skin  3  Often within a day, the pus-filled bumps break open and pools of pus leak onto the skin  4  As the pus dries (usually within 24 to 48 hours), the skin dries out and peels (as shown in this picture)  5  When the dried skin peels off, you see a smooth, glazed surface  6  In a few days or weeks, you may see a new crop of pus-filled bumps covering most of the skin, as the cycle repeats itself  Anyone with pustular psoriasis also feels very sick, and may develop a fever, headache, muscle weakness, and other symptoms  Medical care is often necessary to save the person's life  Erythrodermic psoriasis: Serious and life-threatening, this type of psoriasis requires immediate medical care  When someone develops erythrodermic psoriasis, you may notice:  • Skin on most of the body looks burnt  • Chills, fever, and the person looks extremely ill  • Muscle weakness, a rapid pulse, and severe itch  The person may also be unable to keep warm, so hypothermia can set in quickly  Most people who develop this type of psoriasis already have another type of psoriasis  Before developing erythrodermic psoriasis, they often notice that their psoriasis is worsening or not improving with treatment  If you notice either of these happening, see a board-certified dermatologist     Nails    Nail psoriasis: With any type of psoriasis, you may see changes to your fingernails or toenails   About half of the people who have plaque psoriasis see signs of psoriasis on their fingernails at some point2  When psoriasis affects the nails, you may notice:  • Tiny dents in your nails (called “nail pits”)  • White, yellow, or brown discoloration under one or more nails  • Crumbling, rough nails  • A nail lifting up so that it's no longer attached  • Buildup of skin cells beneath one or more nails, which lifts up the nail  Treatment and proper nail care can help you control nail psoriasis  Psoriatic arthritis: If you have psoriasis, it's important to pay attention to your joints  Some people who have psoriasis develop a type of arthritis called psoriatic arthritis  This is more likely to occur if you have severe psoriasis  Most people notice psoriasis on their skin years before they develop psoriatic arthritis  It's also possible to get psoriatic arthritis before psoriasis, but this is less common  When psoriatic arthritis develops, the signs can be subtle  At first, you may notice:  • A swollen and tender joint, especially in a finger or toe  • Heel pain  • Swelling on the back of your leg, just above your heel  • Stiffness in the morning that fades during the day  Like psoriasis, psoriatic arthritis cannot be cured  Treatment can prevent psoriatic arthritis from worsening, which is important  Allowed to progress, psoriatic arthritis can become disabling  Diagnosis and treatment of psoriasis   Psoriasis is usually diagnosed by clinical features, and skin biopsy if necessary  It is important to decrease factors that aggravate psoriasis  These include treating streptococcal infections, minimizing skin injuries, avoiding sun exposure if it exacerbates psoriasis, smoking, alcohol usage, decreasing stress, and maintaining an optimal body weight  Certain medications such as lithium, beta blockers, antimalarials, and NSAIDs have also been implicated  Suddenly stopping oral steroids or strong topical steroids can cause rebound disease       There are many categories of psoriasis treatments available  Topical therapy  Mild psoriasis is generally treated with topical agents alone  Which treatment is selected may depend on body site, extent and severity of psoriasis  • Emollients  • Coal tar preparations  • Dithranol  • Salicylic acid  • Vitamin D analogue (calcipotriol)  • Topical corticosteroids  • Calcineurin inhibitor (tacrolimus, pimecrolimus)  Phototherapy  Most psoriasis centres offer phototherapy with ultraviolet (UV) radiation, often in combination with topical or systemic agents  Types of phototherapy include  • Narrowband UVB  • Broadband UVB  • Photochemotherapy (PUVA)  • Targeted phototherapy  Systemic therapy  Moderate to severe psoriasis warrants treatment with a systemic agent and/or phototherapy  The most common treatments are:  • Methotrexate  • Ciclosporin  • Acitretin  Other medicines occasionally used for psoriasis include:  • Mycophenolate  • Apremilast  • Hydroxyurea  • Azathioprine  • 6-mercaptopurine  Systemic corticosteroids are best avoided due to a risk of severe withdrawal flare of psoriasis and adverse effects  Biologics or targeted therapies are reserved for conventional treatment-resistant severe psoriasis, mainly because of expense, as side effects compare favorably with other systemic agents  These include:  • Anti-tumour necrosis factor-alpha antagonists (anti-TNF?) infliximab, adalimumab and etanercept  • The interleukin (IL)-12/23 antagonist ustekinumab  • IL-17 antagonists such as secukinumab  Many other monoclonal antibodies are under investigation in the treatment of psoriasis  NEOPLASM OF UNCERTAIN BEHAVIOR OF SKIN    Physical Exam:  • (Anatomic Location); (Size and Morphological Description); (Differential Diagnosis):  o Left neck; 6 mm excoriated nodule; question: resolving insect bite reaction, inflamed cyst or abscess   • Pertinent Positives:  • Pertinent Negatives:     Additional History of Present Condition:  Noted approximately two weeks ago  Reports it was much larger with drainage  Assessment and Plan:   Patient will monitor and if worsening will call the office         Scribe Attestation    I,:  Estefanía Ga am acting as a scribe while in the presence of the attending physician :       I,:  Belén Wright MD personally performed the services described in this documentation    as scribed in my presence :

## 2023-06-12 NOTE — PATIENT INSTRUCTIONS
FOLLOW PSORIASIS    Assessment and Plan:  Based on a thorough discussion of this condition and the management approach to it (including a comprehensive discussion of the known risks, side effects and potential benefits of treatment), the patient (family) agrees to implement the following specific plan:  Continue Tremfya Injections  Complete blood work      Psoriasis is a chronic inflammatory condition that causes the body to make new skin cells in days rather than weeks  As these cells pile up on the surface of the skin, you may see thick, scaly patches of thickened skin  Psoriasis affects 2-4% of males and females  It can start at any age including childhood, with peaks of onset at 15-25 years and 50-60 years  It tends to persist lifelong, fluctuating in extent and severity  It is particularly common in Caucasians but may affect people of any race  About one-third of patients with psoriasis have family members with psoriasis  Psoriasis is multifactorial  It is classified as an immune-mediated inflammatory disease (IMID)  Genetic factors are important and influence the type of psoriasis and response to treatment  What are the signs and symptoms of psoriasis? There are many different types of psoriasis that each have present uniquely  The types of psoriasis include:    Plaque psoriasis: About 80% to 90% of people who have psoriasis develop this type  When plaque psoriasis appears, you may see:  Plaque psoriasis usually presents with symmetrically distributed, red, scaly plaques with well-defined edges  The scale is typically silvery white, except in skin folds where the plaques often appear shiny and they may have a moist peeling surface  The most common sites are scalp, elbows and knees, but any part of the skin can be involved  The plaques are usually very persistent without treatment    Itch is mostly mild but may be severe in some patients, leading to scratching and lichenification (thickened leathery skin with increased skin markings)  Painful skin cracks or fissures may occur  When psoriatic plaques clear up, they may leave brown or pale marks that can be expected to fade over several months  Guttate psoriasis: When someone gets this type of psoriasis, you often see tiny bumps appear on the skin quite suddenly  The bumps tend to cover much of the torso, legs, and arms  Sometimes, the bumps also develop on the face, scalp, and ears  No matter where they appear, the bumps tend to be:   Small and scaly  Stone-colored to pink  Temporary, clearing in a few weeks or months without treatment  When guttate psoriasis clears, it may never return  Why this happens is still a bit of a mystery  Guttate psoriasis tends to develop in children and young adults who've had an infection, such as strep throat  It's possible that when the infection clears so does guttate psoriasis  It's also possible to have:  Guttate psoriasis for life  See the guttate psoriasis clear and plaque psoriasis develop later in life  Plaque psoriasis when you develop guttate psoriasis  There's no way to predict what will happen after the first flare-up of guttate psoriasis clears  Inverse psoriasis: This type of psoriasis develops in areas where skin touches skin, such as the armpits, genitals, and crease of the buttocks  Where the inverse psoriasis appears, you're likely to notice:  Smooth, red patches of skin that look raw  Little, if any, silvery-white coating  Sore or painful skin  Other names for this type of psoriasis are intertriginous psoriasis or flexural psoriasis  Pustular psoriasis: This type of psoriasis causes pus-filled bumps that usually appear only on the feet and hands  While the pus-filled bumps may look like an infection, the skin is not infected  The bumps don't contain bacteria or anything else that could cause an infection    Where pustular psoriasis appears, you tend to notice:  Red, swollen skin that is dotted with pus-filled bumps  Extremely sore or painful skin  Brown dots (and sometimes scale) appear as the pus-filled bumps dry  Pustular psoriasis can make just about any activity that requires your hands or feet, such as typing or walking, unbearably painful  Pustular psoriasis (generalized): Serious and life-threatening, this rare type of psoriasis causes pus-filled bumps to develop on much of the skin  Also called von Zumbusch psoriasis, a flare-up causes this sequence of events:  Skin on most of the body suddenly turns dry, red, and tender  Within hours, pus-filled bumps cover most of the skin  Often within a day, the pus-filled bumps break open and pools of pus leak onto the skin  As the pus dries (usually within 24 to 48 hours), the skin dries out and peels (as shown in this picture)  When the dried skin peels off, you see a smooth, glazed surface  In a few days or weeks, you may see a new crop of pus-filled bumps covering most of the skin, as the cycle repeats itself  Anyone with pustular psoriasis also feels very sick, and may develop a fever, headache, muscle weakness, and other symptoms  Medical care is often necessary to save the person's life  Erythrodermic psoriasis: Serious and life-threatening, this type of psoriasis requires immediate medical care  When someone develops erythrodermic psoriasis, you may notice:  Skin on most of the body looks burnt  Chills, fever, and the person looks extremely ill  Muscle weakness, a rapid pulse, and severe itch  The person may also be unable to keep warm, so hypothermia can set in quickly  Most people who develop this type of psoriasis already have another type of psoriasis  Before developing erythrodermic psoriasis, they often notice that their psoriasis is worsening or not improving with treatment   If you notice either of these happening, see a board-certified dermatologist     Nails    Nail psoriasis: With any type of psoriasis, you may see changes to your fingernails or toenails  About half of the people who have plaque psoriasis see signs of psoriasis on their fingernails at some point2  When psoriasis affects the nails, you may notice:  Tiny dents in your nails (called “nail pits”)  White, yellow, or brown discoloration under one or more nails  Crumbling, rough nails  A nail lifting up so that it's no longer attached  Buildup of skin cells beneath one or more nails, which lifts up the nail  Treatment and proper nail care can help you control nail psoriasis  Psoriatic arthritis: If you have psoriasis, it's important to pay attention to your joints  Some people who have psoriasis develop a type of arthritis called psoriatic arthritis  This is more likely to occur if you have severe psoriasis  Most people notice psoriasis on their skin years before they develop psoriatic arthritis  It's also possible to get psoriatic arthritis before psoriasis, but this is less common  When psoriatic arthritis develops, the signs can be subtle  At first, you may notice:  A swollen and tender joint, especially in a finger or toe  Heel pain  Swelling on the back of your leg, just above your heel  Stiffness in the morning that fades during the day  Like psoriasis, psoriatic arthritis cannot be cured  Treatment can prevent psoriatic arthritis from worsening, which is important  Allowed to progress, psoriatic arthritis can become disabling  Diagnosis and treatment of psoriasis   Psoriasis is usually diagnosed by clinical features, and skin biopsy if necessary  It is important to decrease factors that aggravate psoriasis  These include treating streptococcal infections, minimizing skin injuries, avoiding sun exposure if it exacerbates psoriasis, smoking, alcohol usage, decreasing stress, and maintaining an optimal body weight  Certain medications such as lithium, beta blockers, antimalarials, and NSAIDs have also been implicated   Suddenly stopping oral steroids or strong topical steroids can cause rebound disease  There are many categories of psoriasis treatments available  Topical therapy  Mild psoriasis is generally treated with topical agents alone  Which treatment is selected may depend on body site, extent and severity of psoriasis  Emollients  Coal tar preparations  Dithranol  Salicylic acid  Vitamin D analogue (calcipotriol)  Topical corticosteroids  Calcineurin inhibitor (tacrolimus, pimecrolimus)  Phototherapy  Most psoriasis centres offer phototherapy with ultraviolet (UV) radiation, often in combination with topical or systemic agents  Types of phototherapy include  Narrowband UVB  Broadband UVB  Photochemotherapy (PUVA)  Targeted phototherapy  Systemic therapy  Moderate to severe psoriasis warrants treatment with a systemic agent and/or phototherapy  The most common treatments are:  Methotrexate  Ciclosporin  Acitretin  Other medicines occasionally used for psoriasis include:  Mycophenolate  Apremilast  Hydroxyurea  Azathioprine  6-mercaptopurine  Systemic corticosteroids are best avoided due to a risk of severe withdrawal flare of psoriasis and adverse effects  Biologics or targeted therapies are reserved for conventional treatment-resistant severe psoriasis, mainly because of expense, as side effects compare favorably with other systemic agents  These include:  Anti-tumour necrosis factor-alpha antagonists (anti-TNF?) infliximab, adalimumab and etanercept  The interleukin (IL)-12/23 antagonist ustekinumab  IL-17 antagonists such as secukinumab  Many other monoclonal antibodies are under investigation in the treatment of psoriasis  NEOPLASM OF UNCERTAIN BEHAVIOR OF SKIN    Assessment and Plan:   Patient will monitor and if worsening will call the office

## 2023-06-13 LAB
ALBUMIN SERPL BCP-MCNC: 4 G/DL (ref 3.5–5)
ALP SERPL-CCNC: 45 U/L (ref 46–116)
ALT SERPL W P-5'-P-CCNC: 88 U/L (ref 12–78)
ANION GAP SERPL CALCULATED.3IONS-SCNC: 4 MMOL/L (ref 4–13)
AST SERPL W P-5'-P-CCNC: 35 U/L (ref 5–45)
BASOPHILS # BLD AUTO: 0.03 THOUSANDS/ÂΜL (ref 0–0.1)
BASOPHILS NFR BLD AUTO: 1 % (ref 0–1)
BILIRUB SERPL-MCNC: 0.57 MG/DL (ref 0.2–1)
BUN SERPL-MCNC: 26 MG/DL (ref 5–25)
CALCIUM SERPL-MCNC: 10 MG/DL (ref 8.3–10.1)
CHLORIDE SERPL-SCNC: 106 MMOL/L (ref 96–108)
CO2 SERPL-SCNC: 27 MMOL/L (ref 21–32)
CREAT SERPL-MCNC: 1.26 MG/DL (ref 0.6–1.3)
EOSINOPHIL # BLD AUTO: 0.1 THOUSAND/ÂΜL (ref 0–0.61)
EOSINOPHIL NFR BLD AUTO: 2 % (ref 0–6)
ERYTHROCYTE [DISTWIDTH] IN BLOOD BY AUTOMATED COUNT: 12.9 % (ref 11.6–15.1)
GFR SERPL CREATININE-BSD FRML MDRD: 61 ML/MIN/1.73SQ M
GLUCOSE P FAST SERPL-MCNC: 82 MG/DL (ref 65–99)
HCT VFR BLD AUTO: 43.4 % (ref 36.5–49.3)
HGB BLD-MCNC: 14.9 G/DL (ref 12–17)
IMM GRANULOCYTES # BLD AUTO: 0.02 THOUSAND/UL (ref 0–0.2)
IMM GRANULOCYTES NFR BLD AUTO: 0 % (ref 0–2)
LYMPHOCYTES # BLD AUTO: 1.79 THOUSANDS/ÂΜL (ref 0.6–4.47)
LYMPHOCYTES NFR BLD AUTO: 32 % (ref 14–44)
MCH RBC QN AUTO: 31 PG (ref 26.8–34.3)
MCHC RBC AUTO-ENTMCNC: 34.3 G/DL (ref 31.4–37.4)
MCV RBC AUTO: 90 FL (ref 82–98)
MONOCYTES # BLD AUTO: 0.67 THOUSAND/ÂΜL (ref 0.17–1.22)
MONOCYTES NFR BLD AUTO: 12 % (ref 4–12)
NEUTROPHILS # BLD AUTO: 2.93 THOUSANDS/ÂΜL (ref 1.85–7.62)
NEUTS SEG NFR BLD AUTO: 53 % (ref 43–75)
NRBC BLD AUTO-RTO: 0 /100 WBCS
PLATELET # BLD AUTO: 266 THOUSANDS/UL (ref 149–390)
PMV BLD AUTO: 9.6 FL (ref 8.9–12.7)
POTASSIUM SERPL-SCNC: 4.1 MMOL/L (ref 3.5–5.3)
PROT SERPL-MCNC: 7.9 G/DL (ref 6.4–8.4)
RBC # BLD AUTO: 4.8 MILLION/UL (ref 3.88–5.62)
SODIUM SERPL-SCNC: 137 MMOL/L (ref 135–147)
WBC # BLD AUTO: 5.54 THOUSAND/UL (ref 4.31–10.16)

## 2023-06-28 ENCOUNTER — TELEPHONE (OUTPATIENT)
Dept: DERMATOLOGY | Facility: CLINIC | Age: 63
End: 2023-06-28

## 2023-06-28 NOTE — TELEPHONE ENCOUNTER
Patient states that he just received another call? Told him to see if there was a message  Told patient if he needed me, to call me tomorrow

## 2023-06-28 NOTE — TELEPHONE ENCOUNTER
Rec'd call from patient stating that he received a message from Dr Cash Obrine stating that his liver enzymes are elevated  He'd like return call: regarding what is the next step? He'd like to be contacted after 3:00 pm    Thank you

## 2023-07-28 ENCOUNTER — TELEPHONE (OUTPATIENT)
Age: 63
End: 2023-07-28

## 2023-07-28 NOTE — TELEPHONE ENCOUNTER
PRIOR AUTHORIZATION CHART NOTE    Prior Authorization Initiated With: Pharmacy   [address] specialty pharmacy. Cover mymeds request Zhou #"Shivam Sim 668    Call Back Number:[phone]    Fax Number: [phone]    Name of Person you spoke with: Lukas Willingham     Reference Number: [id number]    MEDICATION INFORMATION:    NAME Tremfya   TYPE OF MEDICATION biologic   STRENGTH 100 mg   QTY 1 ml   REFILLS 3   DIRECTIONS  INJECT 1 SYRINGE UNDER THE SKIN EVERY 8 WEEKS. DIAGNOSIS L40.9 psoriasis      ** If Medication is a biologic, was medication enrollment done. Yes,     Name of [facility]ty [facility]    Name of Pharmacy (Non-Speciality Pharmacy): N/A    Status of Prior Authorization: Approved    Prior Authorization Dates: 7/24/23-07/24/24     If, Denied was medication: N/A     Appealed Started: Outcome: N/A   Peer to Peer Started:    Name of Doctor:    Contact Information    Outcome: N/A      Preferred Medications:     NONE    NONE            Tried/Failed Medications:      OTHER Enbrel, Clobetasol 0.05 % cream, ointment, external solution, external spray; Calcipotriene-Betamethasone-diprop 0.005 and 7.9413 % ointment; salicylic acid powder 15% with aquaphor ointment.      NONE      BSA (Body Surface Area) 0%      OTHER INFORMATION:

## 2023-09-27 ENCOUNTER — TELEPHONE (OUTPATIENT)
Age: 63
End: 2023-09-27

## 2023-09-27 NOTE — TELEPHONE ENCOUNTER
Received call from [address], [facility] (patients PCP). Requesting Dr. Evelyn Jamison last office visit note be faxed to:  [phone]    (I believe they want to update his med llist.)    Thank you.

## 2023-09-28 NOTE — TELEPHONE ENCOUNTER
Josep from [facility] is asking that the requested documents be faxed again, she has not received them yet. Please write "ATTN: JOSEP" on it.      Please fax to [phone] (last ovs notes and med list)

## 2023-09-28 NOTE — TELEPHONE ENCOUNTER
Last two office notes faxed and confirmation received.      Paula Sanchez/ronaldo  09/28/23  3:51 PM

## 2023-11-27 DIAGNOSIS — L40.9 PSORIASIS: ICD-10-CM

## 2023-11-27 RX ORDER — GUSELKUMAB 100 MG/ML
INJECTION SUBCUTANEOUS
Qty: 1 ML | Refills: 3 | Status: SHIPPED | OUTPATIENT
Start: 2023-11-27

## 2024-01-03 ENCOUNTER — OFFICE VISIT (OUTPATIENT)
Age: 64
End: 2024-01-03
Payer: COMMERCIAL

## 2024-01-03 ENCOUNTER — APPOINTMENT (OUTPATIENT)
Age: 64
End: 2024-01-03
Payer: COMMERCIAL

## 2024-01-03 DIAGNOSIS — L40.9 PSORIASIS: ICD-10-CM

## 2024-01-03 DIAGNOSIS — L40.9 PSORIASIS: Primary | ICD-10-CM

## 2024-01-03 PROCEDURE — 86480 TB TEST CELL IMMUN MEASURE: CPT

## 2024-01-03 PROCEDURE — 99213 OFFICE O/P EST LOW 20 MIN: CPT | Performed by: DERMATOLOGY

## 2024-01-03 PROCEDURE — 36415 COLL VENOUS BLD VENIPUNCTURE: CPT

## 2024-01-03 NOTE — PROGRESS NOTES
"[facility] Note     Patient Name: Misbah Mejia  Encounter Date: 01/03/2024     Have you been cared for by a [facility] Dermatologist in the last 3 years and, if so, which description applies to you?    Yes.  I have been here within the last 3 years, and my medical history has NOT changed since that time.  I am MALE/not capable of bearing children.    REVIEW OF SYSTEMS:  Have you recently had or currently have any of the following? No changes in my recent health.   PAST MEDICAL HISTORY:  Have you personally ever had or currently have any of the following?  If \"YES,\" then please provide more detail. No changes in my medical history.   HISTORY OF IMMUNOSUPPRESSION: Do you have a history of any of the following:  Systemic Immunosuppression such as Diabetes, Biologic or Immunotherapy, Chemotherapy, Organ Transplantation, Bone Marrow Transplantation?  No     Answering \"YES\" requires the addition of the dotphrase \"IMMUNOSUPPRESSED\" as the first diagnosis of the patient's visit.   FAMILY HISTORY:  Any \"first degree relatives\" (parent, brother, sister, or child) with the following?    No changes in my family's known health.   PATIENT EXPERIENCE:    Do you want the Dermatologist to perform a COMPLETE skin exam today including a clinical examination under the \"bra and underwear\" areas?  Yes  If necessary, do we have your permission to call and leave a detailed message on your Preferred Phone number that includes your specific medical information?  Yes      No Known Allergies   Current Outpatient Medications:     albuterol (PROVENTIL HFA,VENTOLIN HFA) 90 mcg/act inhaler, 2 PUFF USING INHALER EVERY FOUR TO SIX HOURS AS NEEDED, Disp: , Rfl:     amLODIPine (NORVASC) 10 mg tablet, Take 10 mg by mouth, Disp: , Rfl:     atorvastatin (LIPITOR) 20 mg tablet, , Disp: , Rfl:     Breo Ellipta 100-25 MCG/ACT inhaler, TAKE 1 PUFF EVERY DAY AS DIRECTED RINSE AFTER USE, Disp: , Rfl:     clobetasol (TEMOVATE) 0.05 % cream, " Apply topically 2 (two) times a day To poison ivy rash till resolved, Disp: 30 g, Rfl: 1    clobetasol (TEMOVATE) 0.05 % external solution, Apply topically 2 (two) times a day (Patient not taking: Reported on 6/12/2023), Disp: , Rfl:     clobetasol (TEMOVATE) 0.05 % ointment, Apply topically 2 (two) times a day To rash on right wrist till resolved also for the back (Patient not taking: Reported on 6/12/2023), Disp: 30 g, Rfl: 2    clobetasol propionate (CLOBEX) 0.05 % external spray, Apply topically (Patient not taking: Reported on 6/12/2023), Disp: , Rfl:     dexlansoprazole (DEXILANT) 60 MG capsule, Take by mouth (Patient not taking: Reported on 10/25/2021), Disp: , Rfl:     doxepin (SINEquan) 10 mg/mL solution, by Does not apply route (Patient not taking: Reported on 10/25/2021), Disp: , Rfl:     esomeprazole (NexIUM) 20 mg capsule, Take 40 mg by mouth (Patient not taking: Reported on 10/25/2021), Disp: , Rfl:     fluticasone (CUTIVATE) 0.05 % cream, Apply topically daily To rash till resolved (Patient not taking: Reported on 6/12/2023), Disp: 30 g, Rfl: 1    Guselkumab (Tremfya) 100 MG/ML SOSY, INJECT 1 SYRINGE UNDER THE SKIN EVERY 8 WEEKS., Disp: 1 mL, Rfl: 3    losartan-hydrochlorothiazide (HYZAAR) 100-25 MG per tablet, TK 1 T PO QD, Disp: , Rfl:     Multiple Vitamin (MULTI-VITAMIN DAILY) TABS, Take by mouth, Disp: , Rfl:     omeprazole (PriLOSEC) 20 mg delayed release capsule, , Disp: , Rfl:     tadalafil (CIALIS) 5 MG tablet, Take 5 mg by mouth, Disp: , Rfl:     valsartan-hydrochlorothiazide (DIOVAN-HCT) 320-12.5 MG per tablet, Take by mouth (Patient not taking: Reported on 6/12/2023), Disp: , Rfl:           Whom besides the patient is providing clinical information about today's encounter?   NO ADDITIONAL HISTORIAN (patient alone provided history)    Physical Exam and Assessment/Plan by Diagnosis:  Chief Complaint   Patient presents with    Follow-up     No spot conerns skin exam today. Patient state liver  count is low possibly due to tremfya  history of psoriasis          FOLLOW up PSORIASIS     Physical Exam:  Anatomic Location Affected:  Clear on examin  Morphological Description:    Severity: N/A  Body Percent Affected: 0%  Pertinent Positives:  Pertinent Negatives:     Additional History of Present Condition:  patient is stable on Tremfya.      Assessment and Plan:  Based on a thorough discussion of this condition and the management approach to it (including a comprehensive discussion of the known risks, side effects and potential benefits of treatment), the patient (family) agrees to implement the following specific plan:  Continue Tremfya Injections  Complete blood work Quantiferon TB blood test                Psoriasis is a chronic inflammatory condition that causes the body to make new skin cells in days rather than weeks. As these cells pile up on the surface of the skin, you may see thick, scaly patches of thickened skin.   Psoriasis affects 2-4% of males and females. It can start at any age including childhood, with peaks of onset at 15-25 years and 50-60 years. It tends to persist lifelong, fluctuating in extent and severity. It is particularly common in Caucasians but may affect people of any race. About one-third of patients with psoriasis have family members with psoriasis.  Psoriasis is multifactorial. It is classified as an immune-mediated inflammatory disease (IMID). Genetic factors are important and influence the type of psoriasis and response to treatment.      What are the signs and symptoms of psoriasis?     There are many different types of psoriasis that each have present uniquely. The types of psoriasis include:     Plaque psoriasis: About 80% to 90% of people who have psoriasis develop this type. When plaque psoriasis appears, you may see:  Plaque psoriasis usually presents with symmetrically distributed, red, scaly plaques with well-defined edges. The scale is typically silvery white,  except in skin folds where the plaques often appear shiny and they may have a moist peeling surface. The most common sites are scalp, elbows and knees, but any part of the skin can be involved. The plaques are usually very persistent without treatment.  Itch is mostly mild but may be severe in some patients, leading to scratching and lichenification (thickened leathery skin with increased skin markings). Painful skin cracks or fissures may occur.  When psoriatic plaques clear up, they may leave brown or pale marks that can be expected to fade over several months.     Guttate psoriasis: When someone gets this type of psoriasis, you often see tiny bumps appear on the skin quite suddenly. The bumps tend to cover much of the torso, legs, and arms. Sometimes, the bumps also develop on the face, scalp, and ears. No matter where they appear, the bumps tend to be:   Small and scaly  Lackey-colored to pink  Temporary, clearing in a few weeks or months without treatment  When guttate psoriasis clears, it may never return. Why this happens is still a bit of a mystery. Guttate psoriasis tends to develop in children and young adults who've had an infection, such as strep throat. It's possible that when the infection clears so does guttate psoriasis.  It's also possible to have:  Guttate psoriasis for life  See the guttate psoriasis clear and plaque psoriasis develop later in life  Plaque psoriasis when you develop guttate psoriasis  There's no way to predict what will happen after the first flare-up of guttate psoriasis clears.     Inverse psoriasis: This type of psoriasis develops in areas where skin touches skin, such as the armpits, genitals, and crease of the buttocks. Where the inverse psoriasis appears, you're likely to notice:  Smooth, red patches of skin that look raw  Little, if any, silvery-white coating  Sore or painful skin  Other names for this type of psoriasis are intertriginous psoriasis or flexural  psoriasis.  Pustular psoriasis: This type of psoriasis causes pus-filled bumps that usually appear only on the feet and hands. While the pus-filled bumps may look like an infection, the skin is not infected. The bumps don't contain bacteria or anything else that could cause an infection.  Where pustular psoriasis appears, you tend to notice:  Red, swollen skin that is dotted with pus-filled bumps  Extremely sore or painful skin  Brown dots (and sometimes scale) appear as the pus-filled bumps dry  Pustular psoriasis can make just about any activity that requires your hands or feet, such as typing or walking, unbearably painful.     Pustular psoriasis (generalized): Serious and life-threatening, this rare type of psoriasis causes pus-filled bumps to develop on much of the skin. Also called von Zumbusch psoriasis, a flare-up causes this sequence of events:  Skin on most of the body suddenly turns dry, red, and tender.  Within hours, pus-filled bumps cover most of the skin.  Often within a day, the pus-filled bumps break open and pools of pus leak onto the skin.  As the pus dries (usually within 24 to 48 hours), the skin dries out and peels (as shown in this picture).  When the dried skin peels off, you see a smooth, glazed surface.  In a few days or weeks, you may see a new crop of pus-filled bumps covering most of the skin, as the cycle repeats itself.  Anyone with pustular psoriasis also feels very sick, and may develop a fever, headache, muscle weakness, and other symptoms. Medical care is often necessary to save the person's life.     Erythrodermic psoriasis: Serious and life-threatening, this type of psoriasis requires immediate medical care. When someone develops erythrodermic psoriasis, you may notice:  Skin on most of the body looks burnt  Chills, fever, and the person looks extremely ill  Muscle weakness, a rapid pulse, and severe itch  The person may also be unable to keep warm, so hypothermia can set in  quickly.  Most people who develop this type of psoriasis already have another type of psoriasis. Before developing erythrodermic psoriasis, they often notice that their psoriasis is worsening or not improving with treatment. If you notice either of these happening, see a board-certified dermatologist.     Nails     Nail psoriasis: With any type of psoriasis, you may see changes to your fingernails or toenails. About half of the people who have plaque psoriasis see signs of psoriasis on their fingernails at some point2.  When psoriasis affects the nails, you may notice:  Tiny dents in your nails (called “nail pits”)  White, yellow, or brown discoloration under one or more nails  Crumbling, rough nails  A nail lifting up so that it's no longer attached  Buildup of skin cells beneath one or more nails, which lifts up the nail  Treatment and proper nail care can help you control nail psoriasis.     Psoriatic arthritis: If you have psoriasis, it's important to pay attention to your joints. Some people who have psoriasis develop a type of arthritis called psoriatic arthritis. This is more likely to occur if you have severe psoriasis.  Most people notice psoriasis on their skin years before they develop psoriatic arthritis. It's also possible to get psoriatic arthritis before psoriasis, but this is less common.  When psoriatic arthritis develops, the signs can be subtle. At first, you may notice:  A swollen and tender joint, especially in a finger or toe  Heel pain  Swelling on the back of your leg, just above your heel  Stiffness in the morning that fades during the day  Like psoriasis, psoriatic arthritis cannot be cured. Treatment can prevent psoriatic arthritis from worsening, which is important. Allowed to progress, psoriatic arthritis can become disabling.     Diagnosis and treatment of psoriasis   Psoriasis is usually diagnosed by clinical features, and skin biopsy if necessary.   It is important to decrease factors  that aggravate psoriasis. These include treating streptococcal infections, minimizing skin injuries, avoiding sun exposure if it exacerbates psoriasis, smoking, alcohol usage, decreasing stress, and maintaining an optimal body weight. Certain medications such as lithium, beta blockers, antimalarials, and NSAIDs have also been implicated. Suddenly stopping oral steroids or strong topical steroids can cause rebound disease.      There are many categories of psoriasis treatments available.      Topical therapy  Mild psoriasis is generally treated with topical agents alone. Which treatment is selected may depend on body site, extent and severity of psoriasis.  Emollients  Coal tar preparations  Dithranol  Salicylic acid  Vitamin D analogue (calcipotriol)  Topical corticosteroids  Calcineurin inhibitor (tacrolimus, pimecrolimus)  Phototherapy  Most psoriasis centres offer phototherapy with ultraviolet (UV) radiation, often in combination with topical or systemic agents. Types of phototherapy include  Narrowband UVB  Broadband UVB  Photochemotherapy (PUVA)  Targeted phototherapy  Systemic therapy  Moderate to severe psoriasis warrants treatment with a systemic agent and/or phototherapy. The most common treatments are:  Methotrexate  Ciclosporin  Acitretin  Other medicines occasionally used for psoriasis include:  Mycophenolate  Apremilast  Hydroxyurea  Azathioprine  6-mercaptopurine  Systemic corticosteroids are best avoided due to a risk of severe withdrawal flare of psoriasis and adverse effects.  Biologics or targeted therapies are reserved for conventional treatment-resistant severe psoriasis, mainly because of expense, as side effects compare favorably with other systemic agents. These include:  Anti-tumour necrosis factor-alpha antagonists (anti-TNF?) infliximab, adalimumab and etanercept  The interleukin (IL)-12/23 antagonist ustekinumab  IL-17 antagonists such as secukinumab  Many other monoclonal antibodies are  under investigation in the treatment of psoriasis.    Scribe Attestation      I,:  Waqar House am acting as a scribe while in the presence of the attending physician.:       I,:  Jean Jean MD personally performed the services described in this documentation    as scribed in my presence.:

## 2024-01-03 NOTE — PATIENT INSTRUCTIONS
FOLLOW up PSORIASIS         Assessment and Plan:  Based on a thorough discussion of this condition and the management approach to it (including a comprehensive discussion of the known risks, side effects and potential benefits of treatment), the patient (family) agrees to implement the following specific plan:  Continue Tremfya Injections  Complete blood work QuantiferonTB blood test                Psoriasis is a chronic inflammatory condition that causes the body to make new skin cells in days rather than weeks. As these cells pile up on the surface of the skin, you may see thick, scaly patches of thickened skin.   Psoriasis affects 2-4% of males and females. It can start at any age including childhood, with peaks of onset at 15-25 years and 50-60 years. It tends to persist lifelong, fluctuating in extent and severity. It is particularly common in Caucasians but may affect people of any race. About one-third of patients with psoriasis have family members with psoriasis.  Psoriasis is multifactorial. It is classified as an immune-mediated inflammatory disease (IMID). Genetic factors are important and influence the type of psoriasis and response to treatment.      What are the signs and symptoms of psoriasis?     There are many different types of psoriasis that each have present uniquely. The types of psoriasis include:     Plaque psoriasis: About 80% to 90% of people who have psoriasis develop this type. When plaque psoriasis appears, you may see:  Plaque psoriasis usually presents with symmetrically distributed, red, scaly plaques with well-defined edges. The scale is typically silvery white, except in skin folds where the plaques often appear shiny and they may have a moist peeling surface. The most common sites are scalp, elbows and knees, but any part of the skin can be involved. The plaques are usually very persistent without treatment.  Itch is mostly mild but may be severe in some patients, leading to  scratching and lichenification (thickened leathery skin with increased skin markings). Painful skin cracks or fissures may occur.  When psoriatic plaques clear up, they may leave brown or pale marks that can be expected to fade over several months.     Guttate psoriasis: When someone gets this type of psoriasis, you often see tiny bumps appear on the skin quite suddenly. The bumps tend to cover much of the torso, legs, and arms. Sometimes, the bumps also develop on the face, scalp, and ears. No matter where they appear, the bumps tend to be:   Small and scaly  [place]-colored to pink  Temporary, clearing in a few weeks or months without treatment  When guttate psoriasis clears, it may never return. Why this happens is still a bit of a mystery. Guttate psoriasis tends to develop in children and young adults who've had an infection, such as strep throat. It's possible that when the infection clears so does guttate psoriasis.  It's also possible to have:  Guttate psoriasis for life  See the guttate psoriasis clear and plaque psoriasis develop later in life  Plaque psoriasis when you develop guttate psoriasis  There's no way to predict what will happen after the first flare-up of guttate psoriasis clears.     Inverse psoriasis: This type of psoriasis develops in areas where skin touches skin, such as the armpits, genitals, and crease of the buttocks. Where the inverse psoriasis appears, you're likely to notice:  Smooth, red patches of skin that look raw  Little, if any, silvery-white coating  Sore or painful skin  Other names for this type of psoriasis are intertriginous psoriasis or flexural psoriasis.  Pustular psoriasis: This type of psoriasis causes pus-filled bumps that usually appear only on the feet and hands. While the pus-filled bumps may look like an infection, the skin is not infected. The bumps don't contain bacteria or anything else that could cause an infection.  Where pustular psoriasis appears, you tend  to notice:  Red, swollen skin that is dotted with pus-filled bumps  Extremely sore or painful skin  Brown dots (and sometimes scale) appear as the pus-filled bumps dry  Pustular psoriasis can make just about any activity that requires your hands or feet, such as typing or walking, unbearably painful.     Pustular psoriasis (generalized): Serious and life-threatening, this rare type of psoriasis causes pus-filled bumps to develop on much of the skin. Also called von Zumbusch psoriasis, a flare-up causes this sequence of events:  Skin on most of the body suddenly turns dry, red, and tender.  Within hours, pus-filled bumps cover most of the skin.  Often within a day, the pus-filled bumps break open and pools of pus leak onto the skin.  As the pus dries (usually within 24 to 48 hours), the skin dries out and peels (as shown in this picture).  When the dried skin peels off, you see a smooth, glazed surface.  In a few days or weeks, you may see a new crop of pus-filled bumps covering most of the skin, as the cycle repeats itself.  Anyone with pustular psoriasis also feels very sick, and may develop a fever, headache, muscle weakness, and other symptoms. Medical care is often necessary to save the person's life.     Erythrodermic psoriasis: Serious and life-threatening, this type of psoriasis requires immediate medical care. When someone develops erythrodermic psoriasis, you may notice:  Skin on most of the body looks burnt  Chills, fever, and the person looks extremely ill  Muscle weakness, a rapid pulse, and severe itch  The person may also be unable to keep warm, so hypothermia can set in quickly.  Most people who develop this type of psoriasis already have another type of psoriasis. Before developing erythrodermic psoriasis, they often notice that their psoriasis is worsening or not improving with treatment. If you notice either of these happening, see a board-certified dermatologist.     Nails     Nail psoriasis: With  any type of psoriasis, you may see changes to your fingernails or toenails. About half of the people who have plaque psoriasis see signs of psoriasis on their fingernails at some point2.  When psoriasis affects the nails, you may notice:  Tiny dents in your nails (called “nail pits”)  White, yellow, or brown discoloration under one or more nails  Crumbling, rough nails  A nail lifting up so that it's no longer attached  Buildup of skin cells beneath one or more nails, which lifts up the nail  Treatment and proper nail care can help you control nail psoriasis.     Psoriatic arthritis: If you have psoriasis, it's important to pay attention to your joints. Some people who have psoriasis develop a type of arthritis called psoriatic arthritis. This is more likely to occur if you have severe psoriasis.  Most people notice psoriasis on their skin years before they develop psoriatic arthritis. It's also possible to get psoriatic arthritis before psoriasis, but this is less common.  When psoriatic arthritis develops, the signs can be subtle. At first, you may notice:  A swollen and tender joint, especially in a finger or toe  Heel pain  Swelling on the back of your leg, just above your heel  Stiffness in the morning that fades during the day  Like psoriasis, psoriatic arthritis cannot be cured. Treatment can prevent psoriatic arthritis from worsening, which is important. Allowed to progress, psoriatic arthritis can become disabling.     Diagnosis and treatment of psoriasis   Psoriasis is usually diagnosed by clinical features, and skin biopsy if necessary.   It is important to decrease factors that aggravate psoriasis. These include treating streptococcal infections, minimizing skin injuries, avoiding sun exposure if it exacerbates psoriasis, smoking, alcohol usage, decreasing stress, and maintaining an optimal body weight. Certain medications such as lithium, beta blockers, antimalarials, and NSAIDs have also been  implicated. Suddenly stopping oral steroids or strong topical steroids can cause rebound disease.      There are many categories of psoriasis treatments available.      Topical therapy  Mild psoriasis is generally treated with topical agents alone. Which treatment is selected may depend on body site, extent and severity of psoriasis.  Emollients  Coal tar preparations  Dithranol  Salicylic acid  Vitamin D analogue (calcipotriol)  Topical corticosteroids  Calcineurin inhibitor (tacrolimus, pimecrolimus)  Phototherapy  Most psoriasis centres offer phototherapy with ultraviolet (UV) radiation, often in combination with topical or systemic agents. Types of phototherapy include  Narrowband UVB  Broadband UVB  Photochemotherapy (PUVA)  Targeted phototherapy  Systemic therapy  Moderate to severe psoriasis warrants treatment with a systemic agent and/or phototherapy. The most common treatments are:  Methotrexate  Ciclosporin  Acitretin  Other medicines occasionally used for psoriasis include:  Mycophenolate  Apremilast  Hydroxyurea  Azathioprine  6-mercaptopurine  Systemic corticosteroids are best avoided due to a risk of severe withdrawal flare of psoriasis and adverse effects.  Biologics or targeted therapies are reserved for conventional treatment-resistant severe psoriasis, mainly because of expense, as side effects compare favorably with other systemic agents. These include:  Anti-tumour necrosis factor-alpha antagonists (anti-TNF?) infliximab, adalimumab and etanercept  The interleukin (IL)-12/23 antagonist ustekinumab  IL-17 antagonists such as secukinumab  Many other monoclonal antibodies are under investigation in the treatment of psoriasis.

## 2024-01-04 LAB
GAMMA INTERFERON BACKGROUND BLD IA-ACNC: 0.03 IU/ML
M TB IFN-G BLD-IMP: NEGATIVE
M TB IFN-G CD4+ BCKGRND COR BLD-ACNC: -0.01 IU/ML
M TB IFN-G CD4+ BCKGRND COR BLD-ACNC: -0.01 IU/ML
MITOGEN IGNF BCKGRD COR BLD-ACNC: 9.97 IU/ML

## 2024-07-10 DIAGNOSIS — L40.9 PSORIASIS: ICD-10-CM

## 2024-07-10 RX ORDER — GUSELKUMAB 100 MG/ML
INJECTION SUBCUTANEOUS
Qty: 100 ML | Refills: 3 | Status: SHIPPED | OUTPATIENT
Start: 2024-07-10

## 2024-09-05 ENCOUNTER — TELEPHONE (OUTPATIENT)
Age: 64
End: 2024-09-05

## 2024-09-05 DIAGNOSIS — L40.9 PSORIASIS: ICD-10-CM

## 2024-09-05 RX ORDER — GUSELKUMAB 100 MG/ML
INJECTION SUBCUTANEOUS
Qty: 1 ML | Refills: 3 | Status: SHIPPED | OUTPATIENT
Start: 2024-09-05

## 2024-09-05 NOTE — TELEPHONE ENCOUNTER
"Dr. Jean, please fix your dispense quantity to 1ML not 100      PRIOR AUTHORIZATION CHART NOTE    Prior Authorization Initiated With: Pharmacy   [facility].      Cover mymeds request Key #\" [id number]    Call Back Number:[phone]    Fax Number: [phone]    Name of Person you spoke with: Apple     Reference Number: [id number]    MEDICATION INFORMATION:    NAME Tremfya  TYPE OF MEDICATION biologic  STRENGTH 100 mg  QTY 1 ml  REFILLS 3  DIRECTIONS  INJECT 1 SYRINGE UNDER THE SKIN EVERY 8 WEEKS.    DIAGNOSIS L40.9 psoriasis     "

## 2024-09-05 NOTE — TELEPHONE ENCOUNTER
Cash from [facility] requesting clarification on the script that was sent for Tremfya.      He stated that the script was written for 100ml making it 100 syringes.    Is this correct?    Please advise    The fax number is [phone]  The phone number is [phone]

## 2024-09-30 DIAGNOSIS — L23.7 POISON IVY DERMATITIS: ICD-10-CM

## 2024-09-30 NOTE — TELEPHONE ENCOUNTER
Reason for call:   [x] Refill   [] Prior Auth  [] Other:     Office:   [] PCP/Provider -   [x] Specialty/Provider - Derm pod    Medication: clobetasol (TEMOVATE) 0.05 % cream     Dose/Frequency:  Apply topically 2 (two) times a day     Quantity: 30 g    Pharmacy: [facility]/pharmacy #[id number] - [place] - [address]     Does the patient have enough for 3 days?   [] Yes   [x] No - Send as HP to POD

## 2024-10-01 ENCOUNTER — TELEPHONE (OUTPATIENT)
Age: 64
End: 2024-10-01

## 2024-10-01 NOTE — TELEPHONE ENCOUNTER
Called patient and left a voicemail for some clarification as to why he needs this cream, and might need an office visit for  a re diagnosis.

## 2024-10-01 NOTE — TELEPHONE ENCOUNTER
Called patient about refill request of his Clobetasol cream that was prescribed for poison ivy two years ago. Need some clarification as to why he needs it refilled, may need office visit for re diagnosis. Stated to call the office when he can.

## 2024-10-02 RX ORDER — CLOBETASOL PROPIONATE 0.5 MG/G
CREAM TOPICAL 2 TIMES DAILY
Qty: 30 G | Refills: 0 | Status: SHIPPED | OUTPATIENT
Start: 2024-10-02

## 2024-10-17 ENCOUNTER — TELEPHONE (OUTPATIENT)
Age: 64
End: 2024-10-17

## 2024-10-17 NOTE — TELEPHONE ENCOUNTER
Received call from patient asking for a Prior Authorization for his Guselkumab (Tremfya) 100 MG/ML SOSY script for [facility] in [place]. He has not been able to get his script refilled.    Derm Pod Clinical Rose/Derm [facility] Clinical: Please submit Prior Authorization, and call patient back.

## 2024-10-18 NOTE — TELEPHONE ENCOUNTER
Contacted patient regarding his Tremfya prior authorization. I asked him how long it's been since he received his last injection and he stated he has not gotten his medication since , maybe July but he's pretty sure his last refill was in . I thanked him for this information and apologized for the delay that I did not receive notice that a new PA was needed but I see in his chart that his PA  this past July and I would verify with his provider but there is a chance that he would need to restart with a loading dose again, but I will be sending out his authorization and would touch base with him some time next week. He confirmed understanding and was satisfied with the outcome of our call.

## 2024-10-21 NOTE — TELEPHONE ENCOUNTER
PA for Tremfya 100mg auto injector SUBMITTED     via    [x]CMM-KEY: [id number]  []Surescripts-Case ID #   []Availity-Auth ID # NDC #   []Faxed to plan   []Other website   []Phone call Case ID #     Office notes sent, clinical questions answered. Awaiting determination    Turnaround time for your insurance to make a decision on your Prior Authorization can take 7-21 business days.

## 2024-10-22 NOTE — TELEPHONE ENCOUNTER
PA for Tremfya 100mg auto injector  APPROVED     Date(s) approved 10/21/2024 - 10/21/2025      Patient advised by          []MyChart Message  []Phone call   [x]LMOM  []L/M to call office as no active Communication consent on file  []Unable to leave detailed message as VM not approved on Communication consent       Pharmacy advised by    [x]Fax  []Phone call    Approval letter scanned into Media Yes

## 2024-12-04 ENCOUNTER — OFFICE VISIT (OUTPATIENT)
Age: 64
End: 2024-12-04
Payer: COMMERCIAL

## 2024-12-04 ENCOUNTER — APPOINTMENT (OUTPATIENT)
Age: 64
End: 2024-12-04
Payer: COMMERCIAL

## 2024-12-04 VITALS
TEMPERATURE: 98.5 F | HEART RATE: 101 BPM | SYSTOLIC BLOOD PRESSURE: 138 MMHG | OXYGEN SATURATION: 98 % | WEIGHT: 218 LBS | RESPIRATION RATE: 18 BRPM | DIASTOLIC BLOOD PRESSURE: 74 MMHG | BODY MASS INDEX: 34.21 KG/M2 | HEIGHT: 67 IN

## 2024-12-04 DIAGNOSIS — L40.9 PSORIASIS: Primary | ICD-10-CM

## 2024-12-04 DIAGNOSIS — L40.9 PSORIASIS: ICD-10-CM

## 2024-12-04 PROCEDURE — 86480 TB TEST CELL IMMUN MEASURE: CPT

## 2024-12-04 PROCEDURE — 99213 OFFICE O/P EST LOW 20 MIN: CPT | Performed by: DERMATOLOGY

## 2024-12-04 PROCEDURE — 36415 COLL VENOUS BLD VENIPUNCTURE: CPT

## 2024-12-04 NOTE — PROGRESS NOTES
"[facility] Note     Patient Name: Misbah Mejia  Encounter Date: 12/04/2024     Have you been cared for by a [facility] Dermatologist in the last 3 years and, if so, which description applies to you?    Yes.  I have been here within the last 3 years, and my medical history has NOT changed since that time.  I am MALE/not capable of bearing children.    REVIEW OF SYSTEMS:  Have you recently had or currently have any of the following? No changes in my recent health.   PAST MEDICAL HISTORY:  Have you personally ever had or currently have any of the following?  If \"YES,\" then please provide more detail. No changes in my medical history.   HISTORY OF IMMUNOSUPPRESSION: Do you have a history of any of the following:  Systemic Immunosuppression such as Diabetes, Biologic or Immunotherapy, Chemotherapy, Organ Transplantation, Bone Marrow Transplantation or Prednisone?  No     Answering \"YES\" requires the addition of the dotphrase \"IMMUNOSUPPRESSED\" as the first diagnosis of the patient's visit.   FAMILY HISTORY:  Any \"first degree relatives\" (parent, brother, sister, or child) with the following?    No changes in my family's known health.   PATIENT EXPERIENCE:    Do you want the Dermatologist to perform a COMPLETE skin exam today including a clinical examination under the \"bra and underwear\" areas?  NO  If necessary, do we have your permission to call and leave a detailed message on your Preferred Phone number that includes your specific medical information?  Yes      No Known Allergies   Current Outpatient Medications:     albuterol (PROVENTIL HFA,VENTOLIN HFA) 90 mcg/act inhaler, 2 PUFF USING INHALER EVERY FOUR TO SIX HOURS AS NEEDED, Disp: , Rfl:     amLODIPine (NORVASC) 10 mg tablet, Take 10 mg by mouth, Disp: , Rfl:     atorvastatin (LIPITOR) 20 mg tablet, , Disp: , Rfl:     Breo Ellipta 100-25 MCG/ACT inhaler, TAKE 1 PUFF EVERY DAY AS DIRECTED RINSE AFTER USE, Disp: , Rfl:     clobetasol (TEMOVATE) " 0.05 % cream, Apply topically 2 (two) times a day To poison ivy rash till resolved, Disp: 30 g, Rfl: 0    clobetasol (TEMOVATE) 0.05 % external solution, Apply topically 2 (two) times a day, Disp: , Rfl:     clobetasol (TEMOVATE) 0.05 % ointment, Apply topically 2 (two) times a day To rash on right wrist till resolved also for the back, Disp: 30 g, Rfl: 2    clobetasol propionate (CLOBEX) 0.05 % external spray, Apply topically, Disp: , Rfl:     dexlansoprazole (DEXILANT) 60 MG capsule, Take by mouth, Disp: , Rfl:     doxepin (SINEquan) 10 mg/mL solution, Use, Disp: , Rfl:     esomeprazole (NexIUM) 20 mg capsule, Take 40 mg by mouth, Disp: , Rfl:     fluticasone (CUTIVATE) 0.05 % cream, Apply topically daily To rash till resolved, Disp: 30 g, Rfl: 1    Guselkumab (Tremfya) 100 MG/ML SOSY, INJECT 1 SYRINGE UNDER THE SKIN EVERY 8 WEEKS., Disp: 1 mL, Rfl: 3    losartan-hydrochlorothiazide (HYZAAR) 100-25 MG per tablet, TK 1 T PO QD, Disp: , Rfl:     Multiple Vitamin (MULTI-VITAMIN DAILY) TABS, Take by mouth, Disp: , Rfl:     omeprazole (PriLOSEC) 20 mg delayed release capsule, , Disp: , Rfl:     tadalafil (CIALIS) 5 MG tablet, Take 5 mg by mouth, Disp: , Rfl:     valsartan-hydrochlorothiazide (DIOVAN-HCT) 320-12.5 MG per tablet, Take by mouth, Disp: , Rfl:           Whom besides the patient is providing clinical information about today's encounter?   NO ADDITIONAL HISTORIAN (patient alone provided history)    Physical Exam and Assessment/Plan by Diagnosis:    PSORIASIS    Physical Exam:  Anatomic Location Affected:  clear on exam today  Severity:  N/A  Body Percent Affected: 0%  Pertinent Positives:  Pertinent Negatives:    Additional History of Present Condition:  patient is present for follow up for psoriasis. Patient is currently on Tremfya injection with improvement. Last injection was in September, patient is overdue. Patient has been having issues receiving the injection from the pharmacy.     Assessment and  Plan:  Based on a thorough discussion of this condition and the management approach to it (including a comprehensive discussion of the known risks, side effects and potential benefits of treatment), the patient (family) agrees to implement the following specific plan:  Labs ordered (Quantiferon TB gold) please sent copy of recent lab work to the office  Continue Tremfya injections (patient does injection at home)      Psoriasis is a chronic inflammatory condition that causes the body to make new skin cells in days rather than weeks. As these cells pile up on the surface of the skin, you may see thick, scaly patches of thickened skin.   Psoriasis affects 2-4% of males and females. It can start at any age including childhood, with peaks of onset at 15-25 years and 50-60 years. It tends to persist lifelong, fluctuating in extent and severity. It is particularly common in Caucasians but may affect people of any race. About one-third of patients with psoriasis have family members with psoriasis.  Psoriasis is multifactorial. It is classified as an immune-mediated inflammatory disease (IMID). Genetic factors are important and influence the type of psoriasis and response to treatment.     What are the signs and symptoms of psoriasis?    There are many different types of psoriasis that each have present uniquely. The types of psoriasis include:    Plaque psoriasis: About 80% to 90% of people who have psoriasis develop this type. When plaque psoriasis appears, you may see:  Plaque psoriasis usually presents with symmetrically distributed, red, scaly plaques with well-defined edges. The scale is typically silvery white, except in skin folds where the plaques often appear shiny and they may have a moist peeling surface. The most common sites are scalp, elbows and knees, but any part of the skin can be involved. The plaques are usually very persistent without treatment.  Itch is mostly mild but may be severe in some patients,  leading to scratching and lichenification (thickened leathery skin with increased skin markings). Painful skin cracks or fissures may occur.  When psoriatic plaques clear up, they may leave brown or pale marks that can be expected to fade over several months.    Guttate psoriasis: When someone gets this type of psoriasis, you often see tiny bumps appear on the skin quite suddenly. The bumps tend to cover much of the torso, legs, and arms. Sometimes, the bumps also develop on the face, scalp, and ears. No matter where they appear, the bumps tend to be:   Small and scaly  [place]-colored to pink  Temporary, clearing in a few weeks or months without treatment  When guttate psoriasis clears, it may never return. Why this happens is still a bit of a mystery. Guttate psoriasis tends to develop in children and young adults who've had an infection, such as strep throat. It's possible that when the infection clears so does guttate psoriasis.  It's also possible to have:  Guttate psoriasis for life  See the guttate psoriasis clear and plaque psoriasis develop later in life  Plaque psoriasis when you develop guttate psoriasis  There's no way to predict what will happen after the first flare-up of guttate psoriasis clears.    Inverse psoriasis: This type of psoriasis develops in areas where skin touches skin, such as the armpits, genitals, and crease of the buttocks. Where the inverse psoriasis appears, you're likely to notice:  Smooth, red patches of skin that look raw  Little, if any, silvery-white coating  Sore or painful skin  Other names for this type of psoriasis are intertriginous psoriasis or flexural psoriasis.  Pustular psoriasis: This type of psoriasis causes pus-filled bumps that usually appear only on the feet and hands. While the pus-filled bumps may look like an infection, the skin is not infected. The bumps don't contain bacteria or anything else that could cause an infection.  Where pustular psoriasis appears,  you tend to notice:  Red, swollen skin that is dotted with pus-filled bumps  Extremely sore or painful skin  Brown dots (and sometimes scale) appear as the pus-filled bumps dry  Pustular psoriasis can make just about any activity that requires your hands or feet, such as typing or walking, unbearably painful.    Pustular psoriasis (generalized): Serious and life-threatening, this rare type of psoriasis causes pus-filled bumps to develop on much of the skin. Also called von Zumbusch psoriasis, a flare-up causes this sequence of events:  Skin on most of the body suddenly turns dry, red, and tender.  Within hours, pus-filled bumps cover most of the skin.  Often within a day, the pus-filled bumps break open and pools of pus leak onto the skin.  As the pus dries (usually within 24 to 48 hours), the skin dries out and peels (as shown in this picture).  When the dried skin peels off, you see a smooth, glazed surface.  In a few days or weeks, you may see a new crop of pus-filled bumps covering most of the skin, as the cycle repeats itself.  Anyone with pustular psoriasis also feels very sick, and may develop a fever, headache, muscle weakness, and other symptoms. Medical care is often necessary to save the person's life.    Erythrodermic psoriasis: Serious and life-threatening, this type of psoriasis requires immediate medical care. When someone develops erythrodermic psoriasis, you may notice:  Skin on most of the body looks burnt  Chills, fever, and the person looks extremely ill  Muscle weakness, a rapid pulse, and severe itch  The person may also be unable to keep warm, so hypothermia can set in quickly.  Most people who develop this type of psoriasis already have another type of psoriasis. Before developing erythrodermic psoriasis, they often notice that their psoriasis is worsening or not improving with treatment. If you notice either of these happening, see a board-certified dermatologist.    Nails    Nail psoriasis:  With any type of psoriasis, you may see changes to your fingernails or toenails. About half of the people who have plaque psoriasis see signs of psoriasis on their fingernails at some point2.  When psoriasis affects the nails, you may notice:  Tiny dents in your nails (called “nail pits”)  White, yellow, or brown discoloration under one or more nails  Crumbling, rough nails  A nail lifting up so that it's no longer attached  Buildup of skin cells beneath one or more nails, which lifts up the nail  Treatment and proper nail care can help you control nail psoriasis.    Psoriatic arthritis: If you have psoriasis, it's important to pay attention to your joints. Some people who have psoriasis develop a type of arthritis called psoriatic arthritis. This is more likely to occur if you have severe psoriasis.  Most people notice psoriasis on their skin years before they develop psoriatic arthritis. It's also possible to get psoriatic arthritis before psoriasis, but this is less common.  When psoriatic arthritis develops, the signs can be subtle. At first, you may notice:  A swollen and tender joint, especially in a finger or toe  Heel pain  Swelling on the back of your leg, just above your heel  Stiffness in the morning that fades during the day  Like psoriasis, psoriatic arthritis cannot be cured. Treatment can prevent psoriatic arthritis from worsening, which is important. Allowed to progress, psoriatic arthritis can become disabling.    Diagnosis and treatment of psoriasis   Psoriasis is usually diagnosed by clinical features, and skin biopsy if necessary.   It is important to decrease factors that aggravate psoriasis. These include treating streptococcal infections, minimizing skin injuries, avoiding sun exposure if it exacerbates psoriasis, smoking, alcohol usage, decreasing stress, and maintaining an optimal body weight. Certain medications such as lithium, beta blockers, antimalarials, and NSAIDs have also been  implicated. Suddenly stopping oral steroids or strong topical steroids can cause rebound disease.     There are many categories of psoriasis treatments available.     Topical therapy  Mild psoriasis is generally treated with topical agents alone. Which treatment is selected may depend on body site, extent and severity of psoriasis.  Emollients  Coal tar preparations  Dithranol  Salicylic acid  Vitamin D analogue (calcipotriol)  Topical corticosteroids  Calcineurin inhibitor (tacrolimus, pimecrolimus)  Phototherapy  Most psoriasis centres offer phototherapy with ultraviolet (UV) radiation, often in combination with topical or systemic agents. Types of phototherapy include  Narrowband UVB  Broadband UVB  Photochemotherapy (PUVA)  Targeted phototherapy  Systemic therapy  Moderate to severe psoriasis warrants treatment with a systemic agent and/or phototherapy. The most common treatments are:  Methotrexate  Ciclosporin  Acitretin  Other medicines occasionally used for psoriasis include:  Mycophenolate  Apremilast  Hydroxyurea  Azathioprine  6-mercaptopurine  Systemic corticosteroids are best avoided due to a risk of severe withdrawal flare of psoriasis and adverse effects.  Biologics or targeted therapies are reserved for conventional treatment-resistant severe psoriasis, mainly because of expense, as side effects compare favorably with other systemic agents. These include:  Anti-tumour necrosis factor-alpha antagonists (anti-TNF?) infliximab, adalimumab and etanercept  The interleukin (IL)-12/23 antagonist ustekinumab  IL-17 antagonists such as secukinumab  Many other monoclonal antibodies are under investigation in the treatment of psoriasis.     Scribe Attestation      I,:  Marcela Martinez am acting as a scribe while in the presence of the attending physician.:       I,:  Jean Jean MD personally performed the services described in this documentation    as scribed in my presence.:

## 2024-12-05 ENCOUNTER — RESULTS FOLLOW-UP (OUTPATIENT)
Age: 64
End: 2024-12-05

## 2024-12-05 LAB
GAMMA INTERFERON BACKGROUND BLD IA-ACNC: 0.03 IU/ML
M TB IFN-G BLD-IMP: NEGATIVE
M TB IFN-G CD4+ BCKGRND COR BLD-ACNC: 0 IU/ML
M TB IFN-G CD4+ BCKGRND COR BLD-ACNC: 0.01 IU/ML
MITOGEN IGNF BCKGRD COR BLD-ACNC: 9.97 IU/ML

## 2025-05-06 ENCOUNTER — TELEPHONE (OUTPATIENT)
Age: 65
End: 2025-05-06

## 2025-05-06 NOTE — TELEPHONE ENCOUNTER
Received call from [facility] on behalf of mutal Patient. They need the TB test results patient did in December faxed over to them - Quantiferon TB Gold Plus Assay    [facility]IN[facility]ARD: Please fax over Quantiferon TB Gold Plus Assay results.  Fax: [phone]

## 2025-06-03 ENCOUNTER — TELEPHONE (OUTPATIENT)
Age: 65
End: 2025-06-03